# Patient Record
Sex: FEMALE | URBAN - METROPOLITAN AREA
[De-identification: names, ages, dates, MRNs, and addresses within clinical notes are randomized per-mention and may not be internally consistent; named-entity substitution may affect disease eponyms.]

---

## 2024-07-23 ENCOUNTER — TELEPHONE (OUTPATIENT)
Dept: OBGYN CLINIC | Facility: CLINIC | Age: 29
End: 2024-07-23

## 2024-07-23 NOTE — TELEPHONE ENCOUNTER
Pt calling to report +HPT and to establish PN care. Pt notified that we have both male and female providers that she will need to rotate with throughout her PN care. Pt informed that the doctor that is on-call the day that she delivers is the one that will deliver her. Pt agreed and verbalized understanding.    Pt states her approx LMP is 5/4/24 (has reg 28-32d cycles, lasting 7-8d). Patient states she had 2 ultrasound done at [facility] [place] and was told her estimated delivery date is 2/21/25. Patient advised to reach out to [facility] and request ultrasound report to be faxed to our office prior to OB Nurse Education appointment. Patient agreed. Fax umber provided.     Ht- 5'0\"  Wt- 115 lb    Assisted pt in scheduling OBN PC appt on 8/7 at 2pm. Pt advised to start taking PNV containing DHA, folic acid, and iron in it. Pt verbalized understanding.

## 2024-08-07 ENCOUNTER — TELEPHONE (OUTPATIENT)
Dept: OBGYN | Age: 29
End: 2024-08-07

## 2024-08-14 ENCOUNTER — TELEPHONE (OUTPATIENT)
Dept: OBGYN CLINIC | Facility: CLINIC | Age: 29
End: 2024-08-14

## 2024-08-14 NOTE — TELEPHONE ENCOUNTER
Received limited ultrasound report from [facility] dated 7/15/24. Placed in front office fax bin. Patient accepts appointment for OB Nurse Education phone call tomorrow, 8/15/24 and New OB on 8/27/24.

## 2024-08-15 ENCOUNTER — NURSE ONLY (OUTPATIENT)
Dept: OBGYN CLINIC | Facility: CLINIC | Age: 29
End: 2024-08-15

## 2024-08-15 VITALS — HEIGHT: 60 IN

## 2024-08-15 DIAGNOSIS — Z34.01 ENCOUNTER FOR SUPERVISION OF NORMAL FIRST PREGNANCY IN FIRST TRIMESTER (HCC): Primary | ICD-10-CM

## 2024-08-15 RX ORDER — CHOLECALCIFEROL (VITAMIN D3) 25 MCG
1 TABLET,CHEWABLE ORAL DAILY
COMMUNITY

## 2024-08-15 NOTE — PROGRESS NOTES
Pt called for OBN appt today with no complaints.   Pt informed again of both male and female providers and the need to rotate PN appt with all providers since OB on-call will be the one that delivers her.  Varicella and Normal PN labs ordered. Pt advised all labs must be completed and resulted prior to NPN appt. If labs are not completed and resulted the NPN appt will be cancelled.    NPN appt with MD already scheduled.     Office has a copy of 1st trimester ultrasound results from [facility]'s [place]. Placed in Dr. Hickey slot in [place] office for up coming appointment.       Height: 5 ft 0 in  Weight: 115 lb  BMI: 22.46    Partner's name is Rodriguez Prieto contact #[phone]; race: White  Occupation: [employer]     MEDICAL HISTORY    Anemia Yes No meds   Anesthetic complications No    Anxiety/Depression  Yes Anxiety and Depression-no meds or therapy    We discussed [facility] referral, patient appreciative will reach out if wishes   Autoimmune Disorder No    Asthma  Yes Inhalers-weather induced.    Cancer No    Diabetes  No    Gyne/breast Surgery No    Heart Disease No    Hepatitis/Liver Disease  No    History of blood transfusion No    History of abnormal pap No    Hypertension  No    Infertility  No    Kidney Disease/Frequent UTIs  No    Medication Allergies No    Latex Allergies No    Food Allergies  No    Neurological Disorder/Epilepsy Yes Migraines in high school; taking Ibuprofen. Random now, usually occur once she has been in bright light for long periods   Operations/Hospitalizations Yes Frequent hospitalizations over several years due to GI issues   TB exposure  No    Thyroid Dysfunction No    Trauma/Violence  No    Uterine Anomaly  No    Uterine Fibroids  No    Variocosities/DVTs No    Smoker Yes Stopped smoking and vapping of Nicotine 2 wks ago; smoked for 5 years   Drug usage in prior year No    Alcohol Yes Social drinking, has stopped since pregnant    Would you accept a blood transfusion? If no, are you a  Denominational? Yes    No     Will accept blood and blood products        INFECTION HISTORY    Chlamydia Yes 2020   Pt or partner have hx of Genital Herpes Yes Patient and SO have HSV, patient states last outbreak for her was 1 week ago.    Gonorrhea No    Hepatitis B No    HIV No    HPV No    MRSA No    Syphilis No    Tattoos Yes    Live with someone or Exposed to TB No    Rash or viral illness since LMP  No    Varicella No Vaccinated    Pets Yes Cat x2, patient made aware of toxoplasmosis precautions.         GENETICS SCREENING    Genetic Screening    Genetic Screening/Teratology Counseling- Includes patient, baby's father, or anyone in either family with:  Patient's age 35 years or older as of estimated date of delivery: No     Thalassemia (Italian, Greek, Mediterranean, or  background): MCV less than 80: No     Neural tube defect (Meningomyelocele, Spina bifida, or Anencephaly): No     Congenital heart defect: No     Down syndrome: No     Ross-Sachs (Ashkenazi Sikh, Cajun, Azeri [place]idal[place]): No     Canavan disease (Ashkenazi Sikh): No     Familial dysautonomia (Ashkenazi Sikh): No     Sickle cell disease or trait (): No     Hemophilia or other blood disorders: No     Muscular dystrophy: No    Cystic fibrosis: No     Sandoval's chorea: No     Intellectual disability and/or autism: Yes (Comment: FOB cousin has Autism)     If yes, was the person tested for Fragile X?: No     Other inherited genetic or chromosomal disorder: No     Maternal metabolic disorder (eg. Type 1 diabetes, PKU): No     Patient or baby's father had child with birth defects not listed above: No     Recurrent pregnancy loss, or a stillbirth: No     Medications (including supplements, vitamins, herbs, or OTC drugs)/illicit/recreational drugs/alcohol since last menstrual period: No     If yes, agent(s) and strength/dosage: Prenatal vitamins                 MISC    Infant vaccinations  Yes    Pt. Has answered NO 5P questions  and has NO  risk factors.    Pt. Given What pregnant women need to know handout.

## 2024-08-20 ENCOUNTER — HOSPITAL ENCOUNTER (EMERGENCY)
Facility: HOSPITAL | Age: 29
Discharge: HOME | End: 2024-08-20
Attending: EMERGENCY MEDICINE
Payer: COMMERCIAL

## 2024-08-20 VITALS
BODY MASS INDEX: 22 KG/M2 | WEIGHT: 115 LBS | HEART RATE: 90 BPM | OXYGEN SATURATION: 100 % | RESPIRATION RATE: 18 BRPM | SYSTOLIC BLOOD PRESSURE: 117 MMHG | DIASTOLIC BLOOD PRESSURE: 78 MMHG | TEMPERATURE: 97 F

## 2024-08-20 DIAGNOSIS — R11.10 HYPEREMESIS: Primary | ICD-10-CM

## 2024-08-20 LAB
ALBUMIN SERPL-MCNC: 4.5 G/DL (ref 3.2–4.8)
ALP LIVER SERPL-CCNC: 40 U/L
ALT SERPL-CCNC: 19 U/L
ANION GAP SERPL CALC-SCNC: 10 MMOL/L (ref 0–18)
AST SERPL-CCNC: 20 U/L (ref ?–34)
B-HCG SERPL-ACNC: ABNORMAL MIU/ML
B-HCG UR QL: POSITIVE
BASOPHILS # BLD AUTO: 0.03 X10(3) UL (ref 0–0.2)
BASOPHILS NFR BLD AUTO: 0.2 %
BILIRUB DIRECT SERPL-MCNC: 0.3 MG/DL (ref ?–0.3)
BILIRUB SERPL-MCNC: 1 MG/DL (ref 0.3–1.2)
BILIRUB UR QL: NEGATIVE
BUN BLD-MCNC: 5 MG/DL (ref 9–23)
BUN/CREAT SERPL: 9.1 (ref 10–20)
CALCIUM BLD-MCNC: 9.3 MG/DL (ref 8.7–10.4)
CHLORIDE SERPL-SCNC: 106 MMOL/L (ref 98–112)
CLARITY UR: CLEAR
CO2 SERPL-SCNC: 22 MMOL/L (ref 21–32)
COLOR UR: YELLOW
CREAT BLD-MCNC: 0.55 MG/DL
DEPRECATED RDW RBC AUTO: 40.5 FL (ref 35.1–46.3)
EGFRCR SERPLBLD CKD-EPI 2021: 129 ML/MIN/1.73M2 (ref 60–?)
EOSINOPHIL # BLD AUTO: 0 X10(3) UL (ref 0–0.7)
EOSINOPHIL NFR BLD AUTO: 0 %
ERYTHROCYTE [DISTWIDTH] IN BLOOD BY AUTOMATED COUNT: 11.8 % (ref 11–15)
GLUCOSE BLD-MCNC: 129 MG/DL (ref 70–99)
GLUCOSE UR-MCNC: 300 MG/DL
HCT VFR BLD AUTO: 36.7 %
HGB BLD-MCNC: 12.6 G/DL
HGB UR QL STRIP.AUTO: NEGATIVE
IMM GRANULOCYTES # BLD AUTO: 0.06 X10(3) UL (ref 0–1)
IMM GRANULOCYTES NFR BLD: 0.4 %
KETONES UR-MCNC: >150 MG/DL
LEUKOCYTE ESTERASE UR QL STRIP.AUTO: NEGATIVE
LYMPHOCYTES # BLD AUTO: 0.62 X10(3) UL (ref 1–4)
LYMPHOCYTES NFR BLD AUTO: 3.8 %
MAGNESIUM SERPL-MCNC: 1.6 MG/DL (ref 1.6–2.6)
MCH RBC QN AUTO: 32 PG (ref 26–34)
MCHC RBC AUTO-ENTMCNC: 34.3 G/DL (ref 31–37)
MCV RBC AUTO: 93.1 FL
MONOCYTES # BLD AUTO: 0.14 X10(3) UL (ref 0.1–1)
MONOCYTES NFR BLD AUTO: 0.9 %
NEUTROPHILS # BLD AUTO: 15.47 X10 (3) UL (ref 1.5–7.7)
NEUTROPHILS # BLD AUTO: 15.47 X10(3) UL (ref 1.5–7.7)
NEUTROPHILS NFR BLD AUTO: 94.7 %
NITRITE UR QL STRIP.AUTO: NEGATIVE
OSMOLALITY SERPL CALC.SUM OF ELEC: 285 MOSM/KG (ref 275–295)
PH UR: 6 [PH] (ref 5–8)
PLATELET # BLD AUTO: 242 10(3)UL (ref 150–450)
POTASSIUM SERPL-SCNC: 3.4 MMOL/L (ref 3.5–5.1)
PROT SERPL-MCNC: 7.6 G/DL (ref 5.7–8.2)
PROT UR-MCNC: 30 MG/DL
RBC # BLD AUTO: 3.94 X10(6)UL
SODIUM SERPL-SCNC: 138 MMOL/L (ref 136–145)
SP GR UR STRIP: 1.03 (ref 1–1.03)
UROBILINOGEN UR STRIP-ACNC: NORMAL
WBC # BLD AUTO: 16.3 X10(3) UL (ref 4–11)

## 2024-08-20 PROCEDURE — 81001 URINALYSIS AUTO W/SCOPE: CPT | Performed by: EMERGENCY MEDICINE

## 2024-08-20 PROCEDURE — 96374 THER/PROPH/DIAG INJ IV PUSH: CPT

## 2024-08-20 PROCEDURE — 99284 EMERGENCY DEPT VISIT MOD MDM: CPT

## 2024-08-20 PROCEDURE — 84702 CHORIONIC GONADOTROPIN TEST: CPT | Performed by: EMERGENCY MEDICINE

## 2024-08-20 PROCEDURE — 80048 BASIC METABOLIC PNL TOTAL CA: CPT | Performed by: EMERGENCY MEDICINE

## 2024-08-20 PROCEDURE — 80076 HEPATIC FUNCTION PANEL: CPT | Performed by: EMERGENCY MEDICINE

## 2024-08-20 PROCEDURE — S0028 INJECTION, FAMOTIDINE, 20 MG: HCPCS | Performed by: EMERGENCY MEDICINE

## 2024-08-20 PROCEDURE — 96361 HYDRATE IV INFUSION ADD-ON: CPT

## 2024-08-20 PROCEDURE — 96375 TX/PRO/DX INJ NEW DRUG ADDON: CPT

## 2024-08-20 PROCEDURE — 85025 COMPLETE CBC W/AUTO DIFF WBC: CPT | Performed by: EMERGENCY MEDICINE

## 2024-08-20 PROCEDURE — 81025 URINE PREGNANCY TEST: CPT

## 2024-08-20 PROCEDURE — 83735 ASSAY OF MAGNESIUM: CPT | Performed by: EMERGENCY MEDICINE

## 2024-08-20 RX ORDER — ONDANSETRON 2 MG/ML
4 INJECTION INTRAMUSCULAR; INTRAVENOUS ONCE
Status: COMPLETED | OUTPATIENT
Start: 2024-08-20 | End: 2024-08-20

## 2024-08-20 RX ORDER — DIPHENHYDRAMINE HYDROCHLORIDE 50 MG/ML
25 INJECTION INTRAMUSCULAR; INTRAVENOUS ONCE
Status: COMPLETED | OUTPATIENT
Start: 2024-08-20 | End: 2024-08-20

## 2024-08-20 RX ORDER — ONDANSETRON 4 MG/1
4 TABLET, ORALLY DISINTEGRATING ORAL EVERY 4 HOURS PRN
Qty: 10 TABLET | Refills: 0 | Status: SHIPPED | OUTPATIENT
Start: 2024-08-20 | End: 2024-08-27

## 2024-08-20 RX ORDER — FAMOTIDINE 10 MG/ML
20 INJECTION, SOLUTION INTRAVENOUS ONCE
Status: COMPLETED | OUTPATIENT
Start: 2024-08-20 | End: 2024-08-20

## 2024-08-20 RX ORDER — METOCLOPRAMIDE HYDROCHLORIDE 5 MG/ML
10 INJECTION INTRAMUSCULAR; INTRAVENOUS ONCE
Status: COMPLETED | OUTPATIENT
Start: 2024-08-20 | End: 2024-08-20

## 2024-08-20 NOTE — DISCHARGE INSTRUCTIONS
Avoid Marijuana.  Zofran as needed.  Follow-up with OB as soon as possible.  Return to the ER for changes worsening and read all instructions for further recommendations.

## 2024-08-20 NOTE — ED PROVIDER NOTES
Patient Seen in: [facility] Emergency Department      History     Chief Complaint   Patient presents with    Nausea/Vomiting/Diarrhea     Stated Complaint: vomiting/14 wks preg    Subjective:   29 f lmp May 4 , , 14 weeks pregnant-presents with fairly persistent vomiting all day.  It is clear and yellow at times.  She has been vomiting for the last month almost daily.  The patient does continue to smoke marijuana daily and has had issues with that causing vomiting in the past before.  No urinary symptoms.  No specific pain or fever.  No cough or congestion.  No focal weakness or numbness.  No rash.  She said she has had an ultrasound and measurements and this is where she got her dates from.  She has an appointment with Dr. Angel in 1 week.            Objective:   Past Medical History:    Anemia    Anxiety    Asthma (HCC)    Decorative tattoo    Depression    Genital herpes simplex              History reviewed. No pertinent surgical history.             Social History     Socioeconomic History    Marital status: Single   Tobacco Use    Smoking status: Former     Current packs/day: 0.00     Types: Cigarettes     Quit date:      Years since quittin.6     Passive exposure: Never    Smokeless tobacco: Former     Quit date:    Substance and Sexual Activity    Alcohol use: Not Currently     Comment: Social prior to pregnancy    Drug use: Yes     Types: Cannabis              Review of Systems    Positive for stated Chief Complaint: Nausea/Vomiting/Diarrhea    Other systems are as noted in HPI.  Constitutional and vital signs reviewed.      All other systems reviewed and negative except as noted above.    Physical Exam     ED Triage Vitals   BP 24 1459 133/73   Pulse 24 1459 86   Resp 24 1459 18   Temp 24 1459 97 °F (36.1 °C)   Temp src --    SpO2 24 1459 96 %   O2 Device 24 1806 None (Room air)       Current Vitals:   Vital Signs  BP: 117/78  Pulse: 90  Resp:  18  Temp: 97 °F (36.1 °C)  Fetal Heart Tones: 154    Oxygen Therapy  SpO2: 100 %  O2 Device: None (Room air)            Physical Exam  Constitutional:       Comments: wretching   HENT:      Head: Normocephalic.      Right Ear: External ear normal.      Left Ear: External ear normal.      Nose: Nose normal.      Mouth/Throat:      Mouth: Mucous membranes are dry.   Eyes:      Extraocular Movements: Extraocular movements intact.      Pupils: Pupils are equal, round, and reactive to light.   Cardiovascular:      Rate and Rhythm: Normal rate and regular rhythm.      Pulses: Normal pulses.   Pulmonary:      Effort: Pulmonary effort is normal.   Abdominal:      Palpations: Abdomen is soft. There is no mass.      Tenderness: There is no abdominal tenderness.   Musculoskeletal:         General: Normal range of motion.      Cervical back: Normal range of motion.   Skin:     General: Skin is warm.      Capillary Refill: Capillary refill takes less than 2 seconds.   Neurological:      Mental Status: She is alert.      Sensory: No sensory deficit.      Motor: No weakness.               ED Course     Labs Reviewed   CBC WITH DIFFERENTIAL WITH PLATELET - Abnormal; Notable for the following components:       Result Value    WBC 16.3 (*)     Neutrophil Absolute Prelim 15.47 (*)     Neutrophil Absolute 15.47 (*)     Lymphocyte Absolute 0.62 (*)     All other components within normal limits   BASIC METABOLIC PANEL (8) - Abnormal; Notable for the following components:    Glucose 129 (*)     Potassium 3.4 (*)     BUN 5 (*)     BUN/CREA Ratio 9.1 (*)     All other components within normal limits   URINALYSIS WITH CULTURE REFLEX - Abnormal; Notable for the following components:    Glucose Urine 300 (*)     Ketones Urine >150 (*)     Protein Urine 30 (*)     Squamous Epi. Cells Few (*)     All other components within normal limits   HCG, BETA SUBUNIT (QUANT PREGNANCY TEST) - Abnormal; Notable for the following components:    Hcg  Quantitative 51,668.6 (*)     All other components within normal limits   POCT PREGNANCY URINE - Abnormal; Notable for the following components:    POCT Urine Pregnancy Positive (*)     All other components within normal limits   HEPATIC FUNCTION PANEL (7) - Normal   MAGNESIUM - Normal          ED Course as of 08/20/24 1852  ------------------------------------------------------------  Time: 08/20 1627  Comment: FHTS 154  ------------------------------------------------------------  Time: 08/20 1627  Comment: Labs independently interpreted by me.  There is a leukocytosis but no suspicion of infection as UA is on remarkable other than signs of dehydration hepatic profile normal, BMP showed a mild hypokalemia quant 51,000 which would be appropriate for her dates  ------------------------------------------------------------  Time: 08/20 1723  Comment: Patient was still having some vomiting even after getting Zofran so Benadryl Reglan was given.  Additional fluids will be given.  Will reassess.  ------------------------------------------------------------  Time: 08/20 1732  Comment: Patient now sleeping resting more comfortably  ------------------------------------------------------------  Time: 08/20 1814  Comment: Patient doing better.  Asking us to take her IV out.  I had her do a small oral challenge and she is tolerating some juice.  She did have bowel movements today.  Does not seem to be consistent with a bowel obstruction as she has no other risks.              MDM                                         Medical Decision Making  Patient pregnancy has been vomiting for a month now worse today but also smokes marijuana.  Differential could include hyperemesis from pregnancy versus marijuana versus bowel obstruction versus infectious process versus electrolyte disturbances.  Will continue to check labs give fluids and give Zofran and Pepcid.    Amount and/or Complexity of Data Reviewed  External Data Reviewed:  notes.     Details: OB office telephone notes reviewed.  She does have an appointment  Labs: ordered. Decision-making details documented in ED Course.  Discussion of management or test interpretation with external provider(s): Patient did not want to be observed any longer.  Understands she is welcome to come back if she changes her mind or gets worsening symptoms.    Risk  Prescription drug management.        Disposition and Plan     Clinical Impression:  1. Hyperemesis    Marijuana versus pregnancy as the cause    Disposition:  Discharge  8/20/2024  6:16 pm    Follow-up:  Marci Angel MD  [address] [place] [id number]  [phone]    Follow up            Medications Prescribed:  Discharge Medication List as of 8/20/2024  6:30 PM        START taking these medications    Details   ondansetron 4 MG Oral Tablet Dispersible Take 1 tablet (4 mg total) by mouth every 4 (four) hours as needed for Nausea., Normal, Disp-10 tablet, R-0

## 2024-08-20 NOTE — ED INITIAL ASSESSMENT (HPI)
Patient presents to the ED c/o nausea and vomiting since 0300. Pt is 14 weeks pregnant. Pt states she smoked cannabis around 0300.

## 2024-08-21 ENCOUNTER — APPOINTMENT (OUTPATIENT)
Dept: OBGYN | Age: 29
End: 2024-08-21

## 2024-08-23 ENCOUNTER — HOSPITAL ENCOUNTER (EMERGENCY)
Facility: HOSPITAL | Age: 29
Discharge: HOME | End: 2024-08-24
Attending: EMERGENCY MEDICINE
Payer: COMMERCIAL

## 2024-08-23 DIAGNOSIS — E87.6 HYPOKALEMIA: ICD-10-CM

## 2024-08-23 DIAGNOSIS — O21.0 HYPEREMESIS GRAVIDARUM (HCC): Primary | ICD-10-CM

## 2024-08-23 LAB
ALBUMIN SERPL-MCNC: 5 G/DL (ref 3.2–4.8)
ALBUMIN/GLOB SERPL: 1.4 {RATIO} (ref 1–2)
ALP LIVER SERPL-CCNC: 47 U/L
ALT SERPL-CCNC: 30 U/L
ANION GAP SERPL CALC-SCNC: 12 MMOL/L (ref 0–18)
AST SERPL-CCNC: 28 U/L (ref ?–34)
BASOPHILS # BLD AUTO: 0.03 X10(3) UL (ref 0–0.2)
BASOPHILS NFR BLD AUTO: 0.2 %
BILIRUB SERPL-MCNC: 2.1 MG/DL (ref 0.3–1.2)
BUN BLD-MCNC: 9 MG/DL (ref 9–23)
BUN/CREAT SERPL: 15.5 (ref 10–20)
CALCIUM BLD-MCNC: 10.1 MG/DL (ref 8.7–10.4)
CHLORIDE SERPL-SCNC: 100 MMOL/L (ref 98–112)
CO2 SERPL-SCNC: 23 MMOL/L (ref 21–32)
CREAT BLD-MCNC: 0.58 MG/DL
DEPRECATED RDW RBC AUTO: 39.1 FL (ref 35.1–46.3)
EGFRCR SERPLBLD CKD-EPI 2021: 127 ML/MIN/1.73M2 (ref 60–?)
EOSINOPHIL # BLD AUTO: 0.01 X10(3) UL (ref 0–0.7)
EOSINOPHIL NFR BLD AUTO: 0.1 %
ERYTHROCYTE [DISTWIDTH] IN BLOOD BY AUTOMATED COUNT: 11.8 % (ref 11–15)
GLOBULIN PLAS-MCNC: 3.6 G/DL (ref 2–3.5)
GLUCOSE BLD-MCNC: 76 MG/DL (ref 70–99)
HCT VFR BLD AUTO: 41.1 %
HGB BLD-MCNC: 14.4 G/DL
IMM GRANULOCYTES # BLD AUTO: 0.06 X10(3) UL (ref 0–1)
IMM GRANULOCYTES NFR BLD: 0.4 %
LYMPHOCYTES # BLD AUTO: 1.73 X10(3) UL (ref 1–4)
LYMPHOCYTES NFR BLD AUTO: 10.4 %
MCH RBC QN AUTO: 31.7 PG (ref 26–34)
MCHC RBC AUTO-ENTMCNC: 35 G/DL (ref 31–37)
MCV RBC AUTO: 90.5 FL
MONOCYTES # BLD AUTO: 0.72 X10(3) UL (ref 0.1–1)
MONOCYTES NFR BLD AUTO: 4.3 %
NEUTROPHILS # BLD AUTO: 14.02 X10 (3) UL (ref 1.5–7.7)
NEUTROPHILS # BLD AUTO: 14.02 X10(3) UL (ref 1.5–7.7)
NEUTROPHILS NFR BLD AUTO: 84.6 %
OSMOLALITY SERPL CALC.SUM OF ELEC: 277 MOSM/KG (ref 275–295)
PLATELET # BLD AUTO: 284 10(3)UL (ref 150–450)
POTASSIUM SERPL-SCNC: 3.3 MMOL/L (ref 3.5–5.1)
PROT SERPL-MCNC: 8.6 G/DL (ref 5.7–8.2)
RBC # BLD AUTO: 4.54 X10(6)UL
SODIUM SERPL-SCNC: 135 MMOL/L (ref 136–145)
WBC # BLD AUTO: 16.6 X10(3) UL (ref 4–11)

## 2024-08-23 PROCEDURE — 99284 EMERGENCY DEPT VISIT MOD MDM: CPT

## 2024-08-23 PROCEDURE — 85025 COMPLETE CBC W/AUTO DIFF WBC: CPT

## 2024-08-23 PROCEDURE — 85025 COMPLETE CBC W/AUTO DIFF WBC: CPT | Performed by: EMERGENCY MEDICINE

## 2024-08-23 PROCEDURE — 80053 COMPREHEN METABOLIC PANEL: CPT

## 2024-08-23 PROCEDURE — 96374 THER/PROPH/DIAG INJ IV PUSH: CPT

## 2024-08-23 PROCEDURE — 96361 HYDRATE IV INFUSION ADD-ON: CPT

## 2024-08-23 PROCEDURE — 80053 COMPREHEN METABOLIC PANEL: CPT | Performed by: EMERGENCY MEDICINE

## 2024-08-23 RX ORDER — ONDANSETRON 2 MG/ML
4 INJECTION INTRAMUSCULAR; INTRAVENOUS ONCE
Status: COMPLETED | OUTPATIENT
Start: 2024-08-23 | End: 2024-08-23

## 2024-08-23 RX ORDER — DOXYLAMINE SUCCINATE AND PYRIDOXINE HYDROCHLORIDE, DELAYED RELEASE TABLETS 10 MG/10 MG 10; 10 MG/1; MG/1
2 TABLET, DELAYED RELEASE ORAL ONCE
Status: COMPLETED | OUTPATIENT
Start: 2024-08-23 | End: 2024-08-24

## 2024-08-23 RX ORDER — MAGNESIUM HYDROXIDE/ALUMINUM HYDROXICE/SIMETHICONE 120; 1200; 1200 MG/30ML; MG/30ML; MG/30ML
30 SUSPENSION ORAL ONCE
Status: COMPLETED | OUTPATIENT
Start: 2024-08-23 | End: 2024-08-23

## 2024-08-24 VITALS
SYSTOLIC BLOOD PRESSURE: 119 MMHG | OXYGEN SATURATION: 100 % | HEIGHT: 60 IN | DIASTOLIC BLOOD PRESSURE: 72 MMHG | RESPIRATION RATE: 20 BRPM | HEART RATE: 60 BPM | TEMPERATURE: 98 F | WEIGHT: 115 LBS | BODY MASS INDEX: 22.58 KG/M2

## 2024-08-24 RX ORDER — DOXYLAMINE SUCCINATE AND PYRIDOXINE HYDROCHLORIDE, DELAYED RELEASE TABLETS 10 MG/10 MG 10; 10 MG/1; MG/1
2 TABLET, DELAYED RELEASE ORAL NIGHTLY
Qty: 40 TABLET | Refills: 0 | Status: SHIPPED | OUTPATIENT
Start: 2024-08-24 | End: 2024-09-13

## 2024-08-24 RX ORDER — POTASSIUM CHLORIDE 1500 MG/1
20 TABLET, EXTENDED RELEASE ORAL 2 TIMES DAILY
Qty: 10 TABLET | Refills: 0 | Status: SHIPPED | OUTPATIENT
Start: 2024-08-24 | End: 2024-08-29

## 2024-08-24 NOTE — ED INITIAL ASSESSMENT (HPI)
Pt presents to with c/o vomiting. Pt states she has not been able to keep anything down for 3 days and reports acid reflux. Pt states she has had vomiting for a few weeks that worsened the past three days. Pt states she was seen here Wednesday for the same issue.    14 weeks pregnant.

## 2024-08-24 NOTE — ED PROVIDER NOTES
Patient Seen in: [facility] Emergency Department    History     Chief Complaint   Patient presents with    Pregnancy Issues       HPI    29-year-old female, pregnant at 14 weeks gestation who presents to the emergency department given nonbloody nonbilious emesis, nausea and epigastric burning sensation that she has had even before her pregnancy started several months ago, at times thought to be related to marijuana by prior provider she states, still smoking marijuana.  Denies any urinary symptoms or vaginal bleeding.  No new pelvic cramping    History reviewed.   Past Medical History:    Anemia    Anxiety    Asthma (HCC)    Decorative tattoo    Depression    Genital herpes simplex       History reviewed. History reviewed. No pertinent surgical history.      Medications :  (Not in a hospital admission)       No family history on file.    Smoking Status:   Social History     Socioeconomic History    Marital status: Single   Tobacco Use    Smoking status: Former     Current packs/day: 0.00     Types: Cigarettes     Quit date:      Years since quittin.6     Passive exposure: Never    Smokeless tobacco: Former     Quit date:    Vaping Use    Vaping status: Never Used   Substance and Sexual Activity    Alcohol use: Not Currently     Comment: Social prior to pregnancy    Drug use: Yes     Types: Cannabis       Constitutional and vital signs reviewed.      Social History and Family History elements reviewed from today, pertinent positives to the presenting problem noted.    Physical Exam     ED Triage Vitals [24]   /64   Pulse 96   Resp 20   Temp 97.8 °F (36.6 °C)   Temp src Temporal   SpO2 97 %   O2 Device None (Room air)       All measures to prevent infection transmission during my interaction with the patient were taken. The patient was already wearing a droplet mask on my arrival to the room. Personal protective equipment was worn throughout the duration of the exam.  Handwashing  was performed prior to and after the exam.  Stethoscope and any equipment used during my examination was cleaned with super sani-cloth germicidal wipes following the exam.     Physical Exam    General: NAD  Head: Normocephalic and atraumatic.  Mouth/Throat/Ears/Nose: Oropharynx is clear and moist.   Eyes: Conjunctivae and EOM are normal.   Neck: Normal range of motion. Supple.   Cardiovascular: Normal rate, regular rhythm, normal heart sounds.  Respiratory/Chest: Clear and equal bilaterally. Exhibits no tenderness.  Gastrointestinal: Soft, very slight ttp at epigastrum, non-distended. Bowel sounds are normal.   Musculoskeletal:No swelling or deformity.   Neurological: Alert and appropriate. No focal deficits.   Skin: Skin is warm and dry. No pallor.  Psychiatric: Has a normal mood and affect.      ED Course        Labs Reviewed   COMP METABOLIC PANEL (14) - Abnormal; Notable for the following components:       Result Value    Sodium 135 (*)     Potassium 3.3 (*)     Bilirubin, Total 2.1 (*)     Total Protein 8.6 (*)     Albumin 5.0 (*)     Globulin  3.6 (*)     All other components within normal limits   CBC WITH DIFFERENTIAL WITH PLATELET - Abnormal; Notable for the following components:    WBC 16.6 (*)     Neutrophil Absolute Prelim 14.02 (*)     Neutrophil Absolute 14.02 (*)     All other components within normal limits   URINALYSIS WITH CULTURE REFLEX   RAINBOW DRAW BLUE   RAINBOW DRAW GOLD       As Interpreted by me    Imaging Results Available and Reviewed while in ED: No results found.  ED Medications Administered:   Medications   sodium chloride 0.9 % IV bolus 1,000 mL (1,000 mL Intravenous New Bag 8/23/24 2306)   ondansetron (Zofran) 4 MG/2ML injection 4 mg (4 mg Intravenous Given 8/23/24 2305)   doxylamine-pyridoxine (Diclegis) 10-10 MG DR tab 2 tablet (2 tablets Oral Given 8/24/24 0001)   alum-mag hydroxide-simethicone (Maalox) 200-200-20 MG/5ML oral suspension 30 mL (30 mL Oral Given 8/23/24 9552)          [facility]     Vitals:    08/23/24 2114   BP: 105/64   Pulse: 96   Resp: 20   Temp: 97.8 °F (36.6 °C)   TempSrc: Temporal   SpO2: 97%   Weight: 52.2 kg   Height: 152.4 cm (5')     *I personally reviewed and interpreted all ED vitals.    Pulse Ox: 97%, Room air, Normal     Monitor Interpretation:   normal sinus rhythm as interpreted by me.  The cardiac monitor was ordered given concern for electrolyte derangement.      Medical Decision Making      Differential Diagnosis/ Diagnostic Considerations: GERD, hyperemesis gravidarum cannabinoid hyperemesis syndrome    Complicating Factors: The patient already has pregnancy, marijuana use to contribute to the complexity of this ED evaluation.    I reviewed prior chart records including ED visit note from August 20, 2024.   She feels improved after supportive care, tolerating po.  Fetal heart rate 159.  Labs with just slight hypokalemia on my interpretation, potassium chloride prescribed    I advised her to at least trial marijuana cessation.     Discharged in stable condition.  Patient is comfortable with the plan.    Prescriptions:Diclegis      Disposition and Plan     Clinical Impression:  1. Hyperemesis gravidarum (HCC)    2. Hypokalemia        Disposition:  Discharge    Follow-up:  Marci Angel MD  [address] [place] [id number] [phone]    Schedule an appointment as soon as possible for a visit in 1 day(s)        Medications Prescribed:  Current Discharge Medication List        START taking these medications    Details   doxylamine-pyridoxine 10-10 MG Oral Tab EC Take 2 tablets by mouth nightly for 20 days.  Qty: 40 tablet, Refills: 0      potassium chloride 20 MEQ Oral Tab CR Take 1 tablet (20 mEq total) by mouth 2 (two) times daily for 5 days.  Qty: 10 tablet, Refills: 0

## 2024-08-25 ENCOUNTER — LAB ENCOUNTER (OUTPATIENT)
Dept: LAB | Facility: HOSPITAL | Age: 29
End: 2024-08-25
Attending: OBSTETRICS & GYNECOLOGY
Payer: COMMERCIAL

## 2024-08-25 DIAGNOSIS — Z34.01 ENCOUNTER FOR SUPERVISION OF NORMAL FIRST PREGNANCY IN FIRST TRIMESTER (HCC): ICD-10-CM

## 2024-08-25 LAB
ANTIBODY SCREEN: NEGATIVE
HBV SURFACE AG SER-ACNC: <0.1 [IU]/L
HBV SURFACE AG SERPL QL IA: NONREACTIVE
HCV AB SERPL QL IA: NONREACTIVE
RH BLOOD TYPE: POSITIVE
RUBV IGG SER QL: POSITIVE
RUBV IGG SER-ACNC: 27 IU/ML (ref 10–?)

## 2024-08-25 PROCEDURE — 87389 HIV-1 AG W/HIV-1&-2 AB AG IA: CPT | Performed by: OBSTETRICS & GYNECOLOGY

## 2024-08-25 PROCEDURE — 86592 SYPHILIS TEST NON-TREP QUAL: CPT | Performed by: OBSTETRICS & GYNECOLOGY

## 2024-08-25 PROCEDURE — 86900 BLOOD TYPING SEROLOGIC ABO: CPT

## 2024-08-25 PROCEDURE — 36415 COLL VENOUS BLD VENIPUNCTURE: CPT

## 2024-08-25 PROCEDURE — 86762 RUBELLA ANTIBODY: CPT | Performed by: OBSTETRICS & GYNECOLOGY

## 2024-08-25 PROCEDURE — 86787 VARICELLA-ZOSTER ANTIBODY: CPT

## 2024-08-25 PROCEDURE — 87340 HEPATITIS B SURFACE AG IA: CPT | Performed by: OBSTETRICS & GYNECOLOGY

## 2024-08-25 PROCEDURE — 86780 TREPONEMA PALLIDUM: CPT | Performed by: OBSTETRICS & GYNECOLOGY

## 2024-08-25 PROCEDURE — 86803 HEPATITIS C AB TEST: CPT | Performed by: OBSTETRICS & GYNECOLOGY

## 2024-08-25 PROCEDURE — 86850 RBC ANTIBODY SCREEN: CPT

## 2024-08-25 PROCEDURE — 86901 BLOOD TYPING SEROLOGIC RH(D): CPT

## 2024-08-26 LAB
RPR SER QL: NONREACTIVE
VZV IGG SER IA-ACNC: 219.7 (ref 165–?)

## 2024-08-27 ENCOUNTER — INITIAL PRENATAL (OUTPATIENT)
Dept: OBGYN CLINIC | Facility: CLINIC | Age: 29
End: 2024-08-27

## 2024-08-27 ENCOUNTER — TELEPHONE (OUTPATIENT)
Dept: OBGYN CLINIC | Facility: CLINIC | Age: 29
End: 2024-08-27

## 2024-08-27 VITALS
WEIGHT: 112 LBS | HEART RATE: 79 BPM | SYSTOLIC BLOOD PRESSURE: 91 MMHG | BODY MASS INDEX: 22 KG/M2 | DIASTOLIC BLOOD PRESSURE: 54 MMHG

## 2024-08-27 DIAGNOSIS — Z13.79 GENETIC SCREENING: Primary | ICD-10-CM

## 2024-08-27 DIAGNOSIS — F41.9 ANXIETY: ICD-10-CM

## 2024-08-27 DIAGNOSIS — Z34.02 ENCOUNTER FOR SUPERVISION OF NORMAL FIRST PREGNANCY IN SECOND TRIMESTER (HCC): Primary | ICD-10-CM

## 2024-08-27 PROBLEM — K21.9 GERD (GASTROESOPHAGEAL REFLUX DISEASE): Status: ACTIVE | Noted: 2024-08-27

## 2024-08-27 PROBLEM — O99.320 MARIJUANA USE DURING PREGNANCY (HCC): Status: ACTIVE | Noted: 2024-08-27

## 2024-08-27 PROBLEM — F12.90 MARIJUANA USE DURING PREGNANCY (HCC): Status: ACTIVE | Noted: 2024-08-27

## 2024-08-27 PROBLEM — F32.A ANXIETY AND DEPRESSION: Status: ACTIVE | Noted: 2024-08-27

## 2024-08-27 PROBLEM — Z3A.14 14 WEEKS GESTATION OF PREGNANCY (HCC): Status: ACTIVE | Noted: 2024-08-27

## 2024-08-27 PROBLEM — O98.311 GENITAL HERPES AFFECTING PREGNANCY IN FIRST TRIMESTER (HCC): Status: ACTIVE | Noted: 2024-08-27

## 2024-08-27 PROBLEM — A60.09 GENITAL HERPES AFFECTING PREGNANCY IN FIRST TRIMESTER (HCC): Status: ACTIVE | Noted: 2024-08-27

## 2024-08-27 LAB
APPEARANCE: CLEAR
GLUCOSE (URINE DIPSTICK): NEGATIVE MG/DL
KETONES (URINE DIPSTICK): NEGATIVE MG/DL
MULTISTIX LOT#: NORMAL NUMERIC
NITRITE, URINE: NEGATIVE
PROTEIN (URINE DIPSTICK): NEGATIVE MG/DL
URINE-COLOR: YELLOW

## 2024-08-27 PROCEDURE — 81002 URINALYSIS NONAUTO W/O SCOPE: CPT | Performed by: OBSTETRICS & GYNECOLOGY

## 2024-08-27 NOTE — PROGRESS NOTES
New OB.  Dating US reviewed.  Prenatal record updated.  PL created. ER visits x 2 for N/V. Much better this week. No vb.  Wants genetics.  Discussed MJ cessation and maternal/fetal risks.  PE wnl.  Pap with cultures performed.  +FHT by doppler today.  RTC 4 wks.

## 2024-08-28 LAB
C TRACH DNA SPEC QL NAA+PROBE: NEGATIVE
N GONORRHOEA DNA SPEC QL NAA+PROBE: NEGATIVE
T VAGINALIS RRNA SPEC QL NAA+PROBE: NEGATIVE

## 2024-08-29 LAB
TREPONEMAL ANTIBODIES, TPPA: NON REACTIVE
TREPONEMAL ANTIBODIES, TPPA: NON REACTIVE

## 2024-08-30 ENCOUNTER — TELEPHONE (OUTPATIENT)
Age: 29
End: 2024-08-30

## 2024-08-30 LAB — LAST PAP RESULT: NORMAL

## 2024-08-30 NOTE — TELEPHONE ENCOUNTER
Hello - I am reaching out from the [facility] Navigation department, following up on an order from your provider's office to assist in connecting you with resources for care. If you would like to discuss this further, please give us a call at [phone], or for more immediate assistance you can contact our 24-hour help line at [phone]. We look forward to hearing from you soon.

## 2024-09-04 NOTE — PROGRESS NOTES
Outpatient Maternal-Fetal Medicine Consultation    Dear Dr. Hickey,    Thank you for requesting ultrasound evaluation and maternal fetal medicine consultation on your patient Valeria Hayes.  As you are aware she is a 29 year old female with a Sprague pregnancy at 16w0d.  A maternal-fetal medicine consultation was requested secondary to desire for aneuploidy screening and h/o marijuana use.  Her prenatal records and labs were reviewed.    HISTORY  OB History    Para Term  AB Living   1 0 0 0 0 0   SAB IAB Ectopic Multiple Live Births   0 0 0 0 0     # 1 - Date: None, Sex: None, Weight: None, GA: None, Type: None, Apgar1: None, Apgar5: None, Living: None, Birth Comments: None    Past Medical History  The patient  has a past medical history of Anemia, Anxiety, Asthma (HCC), Decorative tattoo, Depression, and Genital herpes simplex.    Past Surgical History  The patient  has no past surgical history on file.    Family History  The patient has no family status information on file.       Medications:   Current Outpatient Medications:     doxylamine-pyridoxine 10-10 MG Oral Tab EC, Take 2 tablets by mouth nightly for 20 days., Disp: 40 tablet, Rfl: 0    prenatal vitamin with DHA 27-0.8-228 MG Oral Cap, Take 1 capsule by mouth daily., Disp: , Rfl:   Allergies: No Known Allergies      PHYSICAL EXAMINATION:  BP 90/51   Pulse 70   Wt 112 lb (50.8 kg)   LMP 2024 (Approximate)   BMI 21.87 kg/m²   General: alert and oriented,no acute distress  Abdomen: gravid, soft, non-tender  Extremities: non-tender, no edema      OBSTETRIC ULTRASOUND  The patient had a limited ultrasound today which I interpreted the results and reviewed them with the patient.    Ultrasound Findings:  Single IUP in breech presentation.    Placenta is posterior.   A 3 vessel cord is noted.  Cardiac activity is present at 156 bpm   g ( 0 lb 5 oz);  MVP is 5 cm .      See imaging tab for the complete US  report.    DISCUSSION  During her visit we discussed and reviewed the following issues:  CELL FREE FETAL DNA TESTING INFORMATION  Sequencing cfDNA is a screening test; it has false-positive and negative results and does not test for all genetic syndromes or all aneuploidies: it tests for trisomy 21, 18, and 13 and sometimes sex chromosome aneuploidy. Diagnostic procedures, such as amniocentesis, obtain fetal cells, and subsequent testing by karyotyping or microarray can diagnose all aneuploidies; can distinguish between full trisomy and trisomy caused by an unbalanced chromosomal rearrangement; and can detect mosaicism and microdeletions/microduplications (microarray) and, via amniotic fluid AFP and acetylcholinesterase, open neural tube defects. If there are one or more structural anomalies on ultrasound examination, a microarray on amniocytes is the preferred test     Both the mother and the fetal-placental unit produce cfDNA. The primary source of so-called \"fetal\" cfDNA in the maternal circulation is thought to be apoptosis of placental cells (syncytiotrophoblast), while maternal hematopoietic cells are the source of most maternal cfDNA. A lesser source is apoptosis of fetal erythroblasts generating cfDNA in the fetal circulation, and these fragments can cross the placenta and enter the maternal circulation. Since the fetus and the placenta originate from a single fertilized egg, they are usually genetically identical, but differences between the placenta and fetus are important sources of discordant cfDNA test results (eg, confined placental mosaicism).  Trisomy 21, 18, and 13 -- cfDNA is the most sensitive screening option for these aneuploidies. Performance varies by trisomy and by methodology but is rather similar in both high- and low-risk women. Based on multiple meta-analyses, the consensus DRs and FPRs were as follows:  ?Trisomy 21 - DR 99.5 percent, FPR 0.05 percent   ?Trisomy 18 - DR 97.7 percent, FPR  0.04 percent   ?Trisomy 13 - DR 96.1 percent, FPR 0.06 percent     Low cell fraction  Cell-free DNA test failures may occur because of the complex laboratory processing procedures, early gestational age (less than 9-10 weeks), the types of laboratory methods, and the presence of a genetic condition, particularly trisomy 13 or 18 and are also seen more frequently in patients with high BMI, increasing maternal age, certain racial backgrounds (seen more frequently in Black women and South  women in comparison to white women), and IVF pregnancies, 45 as well as maternal drug exposure (low-molecular-weight heparin)  Some aneuploidies also have a low cell fraction.     1 to 5 percent of cfDNA tests do not yield a result, and obese women are at increased risk of receiving a test failure.  The patient has three options in this setting:  Repeat the cfDNA test, if allowed by the laboratory (some failures, like large regions of homozygosity or dizygotic twins, will always cause the test to fail and repeat testing is not an option). Repeat testing, when allowed, is successful in 50 to 80 percent of cases .  Standard serum marker/ultrasound screening.  Invasive procedure (amniocentesis, CVS) and diagnostic testing (karyotyping/microarray).     False-positive cell-free DNA test results occur because of confined placental mosaicism, which can be associated with an increased risk of fetal growth restriction. High or low fetal fraction has been associated with adverse pregnancy outcomes in some studies.      Marijuana -- she has been using marijuana since age 13.  She was using multiple times a day and is down to once or twice daily.  She is going to continue to try to reduce her use during pregnancy.  Marijuana (cannabis) is the most common illicit substance used during pregnancy. Prevalence varies according to maternal age, racial or ethnic background, and socioeconomic status. Self-reported rates of use vary from 2 to 5  percent in many studies to approximately 30 percent among young, urban, and socioeconomically disadvantaged women. Of women who use marijuana, approximately 50 percent continue use during pregnancy.    Chemical products from marijuana use are transferred across the placenta and into breast milk. In rat models, fetal plasma levels were approximately 10 percent of maternal levels after acute exposure to delta-9-tetrahydrocannabinol (THC), the primary psychoactive cannabinoid. However, repetitive exposure of THC resulted in higher fetal levels. In comparison with animal models, studies assessing the impact of marijuana use on humans may be confounded by polysubstance use, socioeconomic factors, and the multiple chemicals present in marijuana smoke, which may present in greater concentrations than in regular tobacco smoke. In addition, contemporary marijuana products have higher quantities of THC than during previous decades of study.    While there is no high-quality evidence to suggest an increase in congenital anomalies among marijuana users, the findings from available studies are limited by relatively small numbers of women whose used only cannabis and confounders, such as lower supplemental folic acid intake among users. Mixed results have also been reported regarding pregnancy outcomes such as low birth weight,  birth, and stillbirth in marijuana users. Better cohort studies that corrected for maternal confounders such as socioeconomic status, maternal weight, tobacco use, and alcohol consumption have reported an association between cannabis use and low birth weight,  birth, small for gestational age, and admission to a  intensive care unit. As an example, in an international case-control study that adjusted for maternal age, cigarette smoking, alcohol use, and socioeconomic index, continued maternal marijuana use at 20 weeks of gestation was associated with more than a five-times-increased  risk of spontaneous  birth.     The American College of Obstetricians and Gynecologists (ACOG) discourages marijuana use during pregnancy due to concerning trends suggest a negative impact on pregnancy outcomes in women who use marijuana. Similarly, while data are limited regarding effects of marijuana use during lactation, ACOG and the Academy of Breastfeeding Medicine discourage marijuana use during breastfeeding.      IMPRESSION:  IUP at 16w0d  Normal fluid, fetal activity and placenta  Marijuana use, daily during pregnancy but reducing the number of puffs daily  Desire for aneuploidy screening    RECOMMENDATIONS:  Continue care with Dr. Hickey  Level II ultrasound at ~20-21 weeks  Third trimester fetal growth & BPP at ~ 32 weeks  Marijuana cessation was encouraged    Total time spent 40 minutes this calendar day which includes preparing to see the patient including chart review, obtaining and/or reviewing additional medical history, performing a physical exam and evaluation, documenting clinical information in the electronic medical record, independently interpreting results, counseling the patient, communicating results to the patient/family/caregiver and coordinating care.     Case discussed with patient who demonstrated understanding and agreement with plan.     Thank you for allowing me to participate in the care of this patient.  Please feel free to contact me with any questions.    Kendra Best MD  Maternal-Fetal Medicine       Note to patient and family:  The 21st Century Cures Act makes medical notes available to patients in the interest of transparency.  However, please be advised that this is a medical document.  It is intended as a peer to peer communication.  It is written in medical language and may contain abbreviations or verbiage that are technical and unfamiliar.  It may appear blunt or direct.  Medical documents are intended to carry relevant information, facts as evident, and the  clinical opinion of the practitioner.

## 2024-09-06 ENCOUNTER — HOSPITAL ENCOUNTER (OUTPATIENT)
Dept: PERINATAL CARE | Facility: HOSPITAL | Age: 29
Discharge: HOME | End: 2024-09-06
Attending: OBSTETRICS & GYNECOLOGY
Payer: COMMERCIAL

## 2024-09-06 VITALS
DIASTOLIC BLOOD PRESSURE: 51 MMHG | BODY MASS INDEX: 22 KG/M2 | HEART RATE: 70 BPM | SYSTOLIC BLOOD PRESSURE: 90 MMHG | WEIGHT: 112 LBS

## 2024-09-06 DIAGNOSIS — Z13.79 GENETIC TESTING: Primary | ICD-10-CM

## 2024-09-06 DIAGNOSIS — Z13.79 GENETIC SCREENING: ICD-10-CM

## 2024-09-06 DIAGNOSIS — Z13.79 GENETIC TESTING: ICD-10-CM

## 2024-09-06 DIAGNOSIS — F12.90 MARIJUANA USE DURING PREGNANCY (HCC): ICD-10-CM

## 2024-09-06 DIAGNOSIS — O99.320 MARIJUANA USE DURING PREGNANCY (HCC): ICD-10-CM

## 2024-09-06 PROCEDURE — 36415 COLL VENOUS BLD VENIPUNCTURE: CPT

## 2024-09-06 PROCEDURE — 76815 OB US LIMITED FETUS(S): CPT | Performed by: OBSTETRICS & GYNECOLOGY

## 2024-09-06 NOTE — PROGRESS NOTES
SUBJECTIVE:   Hailey Blanco is a 3 week old female, here for a routine health maintenance visit,   accompanied by her mother.    Patient was roomed by: Camila Salas MA 202010:31 AM    Do you have any forms to be completed?  no    BIRTH HISTORY  No birth history on file.  Hepatitis B # 1 given in nursery: yes   metabolic screening: Results not known at this time--FAX request to [facility] at [phone]   hearing screen: Passed--parent report     SOCIAL HISTORY  Child lives with: mother, maternal grandmother and maternal step father and moms boyfriend  Who takes care of your infant: mother  Language(s) spoken at home: English  Recent family changes/social stressors: recent birth of a baby    SAFETY/HEALTH RISK  Is your child around anyone who smokes?  YES, passive exposure from mom smokes outside and not around baby   TB exposure:           None    Is your car seat less than 6 years old, in the back seat, rear-facing, 5-point restraint:  Yes    DAILY ACTIVITIES  WATER SOURCE: city water    NUTRITION  Breastfeeding and formula: Similac Sensitive (lactose free)    SLEEP  Arrangements:    crib    sleeps on back  Problems    none    ELIMINATION  Stools:    normal breast milk stools    every 2 days  Urination:    normal wet diapers    QUESTIONS/CONCERNS: questions about pooping as she seems like she is in pain and only goes every two days     DEVELOPMENT  Milestones (by observation/ exam/ report) 75-90% ile  PERSONAL/ SOCIAL/COGNITIVE:    Sustains periods of wakefulness for feeding    Makes brief eye contact with adult when held  LANGUAGE:    Cries with discomfort    Calms to adult's voice  GROSS MOTOR:    Lifts head briefly when prone    Kicks / equal movements  FINE MOTOR/ ADAPTIVE:    Keeps hands in a fist    PROBLEM LIST  Patient Active Problem List   Diagnosis     Breast buds in        MEDICATIONS  Current Outpatient Medications   Medication Sig Dispense Refill     cholecalciferol  Pt her for a Limited OB ultrasound  CF DNA collected   "(JUST D) 10 MCG/ML (400 units/ml) LIQD liquid Take 1 mL (400 Units) by mouth daily 90 mL 3        ALLERGY  No Known Allergies    IMMUNIZATIONS  Immunization History   Administered Date(s) Administered     Hepatitis A Vac Ped/Adol-3 Dose 2020       HEALTH HISTORY  No major problems since discharge from nursery    ROS  Constitutional, eye, ENT, skin, respiratory, cardiac, and GI are normal except as otherwise noted.    OBJECTIVE:   EXAM  Pulse 168   Temp 97.9  F (36.6  C) (Tympanic)   Resp 22   Ht 1' 8.75\" (0.527 m)   Wt 8 lb 2 oz (3.685 kg)   HC 14\" (35.6 cm)   SpO2 100%   BMI 13.27 kg/m    31 %ile based on WHO (Girls, 0-2 years) head circumference-for-age based on Head Circumference recorded on 2020.  25 %ile based on WHO (Girls, 0-2 years) weight-for-age data based on Weight recorded on 2020.  43 %ile based on WHO (Girls, 0-2 years) Length-for-age data based on Length recorded on 2020.  21 %ile based on WHO (Girls, 0-2 years) weight-for-recumbent length based on body measurements available as of 2020.  GENERAL: Active, alert,  no  distress.  SKIN: Clear. No significant rash, abnormal pigmentation or lesions.  HEAD: Normocephalic. Normal fontanels and sutures.  EYES: Conjunctivae and cornea normal. Red reflexes present bilaterally.  EARS: normal: no effusions, no erythema, normal landmarks  NOSE: Normal without discharge.  MOUTH/THROAT: Clear. No oral lesions.  NECK: Supple, no masses.  LYMPH NODES: No adenopathy  LUNGS: Clear. No rales, rhonchi, wheezing or retractions  HEART: Regular rate and rhythm. Normal S1/S2. No murmurs. Normal femoral pulses.  ABDOMEN: Soft, non-tender, not distended, no masses or hepatosplenomegaly. Normal umbilicus and bowel sounds.   GENITALIA: Normal female external genitalia. Ezequiel stage I,  No inguinal herniae are present.  EXTREMITIES: Hips normal with negative Ortolani and Diamond. Symmetric creases and  no deformities  NEUROLOGIC: Normal tone " throughout. Normal reflexes for age    ASSESSMENT/PLAN:   Well check  Anticipatory Guidance  The following topics were discussed:  SOCIAL/FAMILY    postpartum depression / fatigue  NUTRITION:    delay solid food    sucking needs/ pacifier  HEALTH/ SAFETY:    sleep habits    sleep on back    Preventive Care Plan  Immunizations     Reviewed, up to date  Referrals/Ongoing Specialty care: No   See other orders in [facility]    Resources:  [place] Child and Teen Checkups (C&TC) Schedule of Age-Related Screening Standards    FOLLOW-UP:      in 1 month for Preventive Care visit    Wesley Riojas MD  [facility]

## 2024-09-10 ENCOUNTER — TELEPHONE (OUTPATIENT)
Age: 29
End: 2024-09-10

## 2024-09-12 ENCOUNTER — HOSPITAL ENCOUNTER (EMERGENCY)
Facility: HOSPITAL | Age: 29
Discharge: HOME | End: 2024-09-12
Payer: COMMERCIAL

## 2024-09-12 VITALS
WEIGHT: 112 LBS | BODY MASS INDEX: 21.99 KG/M2 | TEMPERATURE: 98 F | DIASTOLIC BLOOD PRESSURE: 69 MMHG | HEIGHT: 60 IN | RESPIRATION RATE: 20 BRPM | SYSTOLIC BLOOD PRESSURE: 135 MMHG | HEART RATE: 77 BPM | OXYGEN SATURATION: 100 %

## 2024-09-12 DIAGNOSIS — O21.0 HYPEREMESIS GRAVIDARUM (HCC): Primary | ICD-10-CM

## 2024-09-12 LAB
ALBUMIN SERPL-MCNC: 4.4 G/DL (ref 3.2–4.8)
ALBUMIN/GLOB SERPL: 1.3 {RATIO} (ref 1–2)
ALP LIVER SERPL-CCNC: 47 U/L
ALT SERPL-CCNC: 17 U/L
ANION GAP SERPL CALC-SCNC: 10 MMOL/L (ref 0–18)
AST SERPL-CCNC: 21 U/L (ref ?–34)
BASOPHILS # BLD AUTO: 0.02 X10(3) UL (ref 0–0.2)
BASOPHILS NFR BLD AUTO: 0.1 %
BILIRUB SERPL-MCNC: 0.9 MG/DL (ref 0.3–1.2)
BILIRUB UR QL: NEGATIVE
BUN BLD-MCNC: 5 MG/DL (ref 9–23)
BUN/CREAT SERPL: 9.8 (ref 10–20)
CALCIUM BLD-MCNC: 9.5 MG/DL (ref 8.7–10.4)
CHLORIDE SERPL-SCNC: 107 MMOL/L (ref 98–112)
CO2 SERPL-SCNC: 22 MMOL/L (ref 21–32)
COLOR UR: YELLOW
CREAT BLD-MCNC: 0.51 MG/DL
DEPRECATED RDW RBC AUTO: 41 FL (ref 35.1–46.3)
EGFRCR SERPLBLD CKD-EPI 2021: 131 ML/MIN/1.73M2 (ref 60–?)
EOSINOPHIL # BLD AUTO: 0.01 X10(3) UL (ref 0–0.7)
EOSINOPHIL NFR BLD AUTO: 0.1 %
ERYTHROCYTE [DISTWIDTH] IN BLOOD BY AUTOMATED COUNT: 12.1 % (ref 11–15)
GLOBULIN PLAS-MCNC: 3.3 G/DL (ref 2–3.5)
GLUCOSE BLD-MCNC: 104 MG/DL (ref 70–99)
GLUCOSE UR-MCNC: 100 MG/DL
HCT VFR BLD AUTO: 36.9 %
HGB BLD-MCNC: 12.7 G/DL
HGB UR QL STRIP.AUTO: NEGATIVE
IMM GRANULOCYTES # BLD AUTO: 0.07 X10(3) UL (ref 0–1)
IMM GRANULOCYTES NFR BLD: 0.5 %
KETONES UR-MCNC: >150 MG/DL
LEUKOCYTE ESTERASE UR QL STRIP.AUTO: NEGATIVE
LIPASE SERPL-CCNC: 45 U/L (ref 12–53)
LYMPHOCYTES # BLD AUTO: 1.15 X10(3) UL (ref 1–4)
LYMPHOCYTES NFR BLD AUTO: 8.3 %
MCH RBC QN AUTO: 31.7 PG (ref 26–34)
MCHC RBC AUTO-ENTMCNC: 34.4 G/DL (ref 31–37)
MCV RBC AUTO: 92 FL
MONOCYTES # BLD AUTO: 0.41 X10(3) UL (ref 0.1–1)
MONOCYTES NFR BLD AUTO: 3 %
NEUTROPHILS # BLD AUTO: 12.2 X10 (3) UL (ref 1.5–7.7)
NEUTROPHILS # BLD AUTO: 12.2 X10(3) UL (ref 1.5–7.7)
NEUTROPHILS NFR BLD AUTO: 88 %
NITRITE UR QL STRIP.AUTO: NEGATIVE
OSMOLALITY SERPL CALC.SUM OF ELEC: 286 MOSM/KG (ref 275–295)
PH UR: 7.5 [PH] (ref 5–8)
PLATELET # BLD AUTO: 269 10(3)UL (ref 150–450)
POTASSIUM SERPL-SCNC: 3.6 MMOL/L (ref 3.5–5.1)
PROT SERPL-MCNC: 7.7 G/DL (ref 5.7–8.2)
PROT UR-MCNC: 20 MG/DL
RBC # BLD AUTO: 4.01 X10(6)UL
SODIUM SERPL-SCNC: 139 MMOL/L (ref 136–145)
SP GR UR STRIP: 1.02 (ref 1–1.03)
UROBILINOGEN UR STRIP-ACNC: NORMAL
WBC # BLD AUTO: 13.9 X10(3) UL (ref 4–11)

## 2024-09-12 PROCEDURE — 85025 COMPLETE CBC W/AUTO DIFF WBC: CPT

## 2024-09-12 PROCEDURE — 83690 ASSAY OF LIPASE: CPT | Performed by: NURSE PRACTITIONER

## 2024-09-12 PROCEDURE — S0028 INJECTION, FAMOTIDINE, 20 MG: HCPCS | Performed by: NURSE PRACTITIONER

## 2024-09-12 PROCEDURE — 96361 HYDRATE IV INFUSION ADD-ON: CPT

## 2024-09-12 PROCEDURE — 96374 THER/PROPH/DIAG INJ IV PUSH: CPT

## 2024-09-12 PROCEDURE — 99284 EMERGENCY DEPT VISIT MOD MDM: CPT

## 2024-09-12 PROCEDURE — 80053 COMPREHEN METABOLIC PANEL: CPT

## 2024-09-12 PROCEDURE — 96375 TX/PRO/DX INJ NEW DRUG ADDON: CPT

## 2024-09-12 PROCEDURE — 81001 URINALYSIS AUTO W/SCOPE: CPT | Performed by: NURSE PRACTITIONER

## 2024-09-12 RX ORDER — ONDANSETRON 4 MG/1
4 TABLET, ORALLY DISINTEGRATING ORAL ONCE
Status: DISCONTINUED | OUTPATIENT
Start: 2024-09-12 | End: 2024-09-12

## 2024-09-12 RX ORDER — DOXYLAMINE SUCCINATE AND PYRIDOXINE HYDROCHLORIDE, DELAYED RELEASE TABLETS 10 MG/10 MG 10; 10 MG/1; MG/1
2 TABLET, DELAYED RELEASE ORAL ONCE
Status: DISCONTINUED | OUTPATIENT
Start: 2024-09-12 | End: 2024-09-12

## 2024-09-12 RX ORDER — METOCLOPRAMIDE HYDROCHLORIDE 5 MG/ML
10 INJECTION INTRAMUSCULAR; INTRAVENOUS ONCE
Status: COMPLETED | OUTPATIENT
Start: 2024-09-12 | End: 2024-09-12

## 2024-09-12 RX ORDER — ONDANSETRON 2 MG/ML
4 INJECTION INTRAMUSCULAR; INTRAVENOUS ONCE
Status: COMPLETED | OUTPATIENT
Start: 2024-09-12 | End: 2024-09-12

## 2024-09-12 RX ORDER — FAMOTIDINE 10 MG/ML
20 INJECTION, SOLUTION INTRAVENOUS ONCE
Status: COMPLETED | OUTPATIENT
Start: 2024-09-12 | End: 2024-09-12

## 2024-09-12 RX ORDER — MAGNESIUM HYDROXIDE/ALUMINUM HYDROXICE/SIMETHICONE 120; 1200; 1200 MG/30ML; MG/30ML; MG/30ML
30 SUSPENSION ORAL ONCE
Status: DISCONTINUED | OUTPATIENT
Start: 2024-09-12 | End: 2024-09-12

## 2024-09-12 NOTE — ED QUICK NOTES
Patient decided she does not feel well enough to go home. Attempted PO fluids and vomited, ED provider, Jadon notified. Orders received

## 2024-09-12 NOTE — ED INITIAL ASSESSMENT (HPI)
Patient arrived in wheelchair to ED, A/O x 4, with complaints of constant N/V/D since this morning. Patient states she is 16 weeks pregnant. Patient states the happened a few weeks ago but resolved, now has returned. Patient states she is experiencing acid reflux and is having abdominal pain. Patient denies any vaginal discharge or bleeding. Patient doubled over and tearful during triage.

## 2024-09-12 NOTE — ED QUICK NOTES
Patient decided she does not want medications. Patient refused discharge paperwork and discharge teaching stating \"I just want to go home.\" Per ED provider patient ok to discharge. Patient in NAD, ABCs intact. Patient stable and ambulatory at discharge. Patient left department with all belongings.

## 2024-09-12 NOTE — ED PROVIDER NOTES
Patient Seen in: [facility] Emergency Department      History     Chief Complaint   Patient presents with    Nausea/Vomiting/Diarrhea     Stated Complaint: N/V/D, fever    Subjective:   29yof w hx of hx of anemia, anxiety, asthma currently 15 weeks pregnant by US reports with nausea, vomiting, and reflux. Started earlier today. Similar with multiple previous episodes this pregnancy. Reports she has ceased marijuana use since last ER visit. No fever, no hematemesis, no chest pain. No urinary symptoms. No flank pain.             Objective:   Past Medical History:    Anemia    Anxiety    Asthma (HCC)    Decorative tattoo    Depression    Genital herpes simplex              History reviewed. No pertinent surgical history.             Social History     Socioeconomic History    Marital status: Single   Tobacco Use    Smoking status: Former     Current packs/day: 0.00     Types: Cigarettes     Quit date:      Years since quittin.7     Passive exposure: Never    Smokeless tobacco: Former     Quit date:    Vaping Use    Vaping status: Never Used   Substance and Sexual Activity    Alcohol use: Not Currently     Comment: Social prior to pregnancy    Drug use: Yes     Types: Cannabis     Social Determinants of Health     Financial Resource Strain: Low Risk  (2024)    Financial Resource Strain     Difficulty of Paying Living Expenses: Somewhat hard     Med Affordability: No   Food Insecurity: Unknown (2024)    Food Insecurity     Food Insecurity: Patient declined   Transportation Needs: No Transportation Needs (2024)    Transportation Needs     Lack of Transportation: No   Stress: Stress Concern Present (2024)    Stress     Feeling of Stress : Yes   Housing Stability: Low Risk  (2024)    Housing Stability     Housing Instability: No              Review of Systems   All other systems reviewed and are negative.      Positive for stated Chief Complaint:  Nausea/Vomiting/Diarrhea    Other systems are as noted in HPI.  Constitutional and vital signs reviewed.      All other systems reviewed and negative except as noted above.    Physical Exam     ED Triage Vitals [09/12/24 1118]   /89   Pulse 89   Resp 25   Temp 97.5 °F (36.4 °C)   Temp src Temporal   SpO2 100 %   O2 Device None (Room air)       Current Vitals:   Vital Signs  BP: 120/82  Pulse: 80  Resp: 22  Temp: 97.5 °F (36.4 °C)  Temp src: Temporal  Fetal Heart Tones: 110    Oxygen Therapy  SpO2: 100 %  O2 Device: None (Room air)            Physical Exam  Vitals and nursing note reviewed.   Constitutional:       General: She is not in acute distress.     Appearance: She is well-developed.   HENT:      Head: Normocephalic and atraumatic.      Nose: Nose normal.      Mouth/Throat:      Mouth: Mucous membranes are moist.   Eyes:      Conjunctiva/sclera: Conjunctivae normal.      Pupils: Pupils are equal, round, and reactive to light.   Cardiovascular:      Rate and Rhythm: Normal rate and regular rhythm.      Heart sounds: Normal heart sounds.   Pulmonary:      Effort: Pulmonary effort is normal.      Breath sounds: Normal breath sounds.   Abdominal:      General: Bowel sounds are normal.      Palpations: Abdomen is soft.   Musculoskeletal:         General: No tenderness or deformity. Normal range of motion.      Cervical back: Normal range of motion and neck supple.   Skin:     General: Skin is warm and dry.      Capillary Refill: Capillary refill takes less than 2 seconds.      Findings: No rash.      Comments: Normal color   Neurological:      General: No focal deficit present.      Mental Status: She is alert and oriented to person, place, and time.      GCS: GCS eye subscore is 4. GCS verbal subscore is 5. GCS motor subscore is 6.      Cranial Nerves: No cranial nerve deficit.      Gait: Gait normal.               ED Course     Labs Reviewed   CBC WITH DIFFERENTIAL WITH PLATELET - Abnormal; Notable for the  following components:       Result Value    WBC 13.9 (*)     Neutrophil Absolute Prelim 12.20 (*)     Neutrophil Absolute 12.20 (*)     All other components within normal limits   COMP METABOLIC PANEL (14) - Abnormal; Notable for the following components:    Glucose 104 (*)     BUN 5 (*)     Creatinine 0.51 (*)     BUN/CREA Ratio 9.8 (*)     All other components within normal limits   URINALYSIS WITH CULTURE REFLEX - Abnormal; Notable for the following components:    Clarity Urine Ex.Turbid (*)     Glucose Urine 100 (*)     Ketones Urine >150 (*)     Protein Urine 20 (*)     All other components within normal limits   LIPASE - Normal   RAINBOW DRAW LAVENDER   RAINBOW DRAW LIGHT GREEN   RAINBOW DRAW BLUE   RAINBOW DRAW GOLD              [facility]                  Medical Decision Making  28yo/f w hx and exam as stated; vomiting, pregnant    Vomiting stopped  Labs non acute  No pain  Normal fht    After 1st round of medications patient stated she wants to go home, wants no further treatment    Amount and/or Complexity of Data Reviewed  Labs:  Decision-making details documented in ED Course.    Risk  OTC drugs.  Prescription drug management.        Disposition and Plan     Clinical Impression:  1. Hyperemesis gravidarum (HCC)         Disposition:  Discharge  9/12/2024  1:07 pm    Follow-up:  Shaggy Hickey DO  [address] [facility] [id number] [phone]    Follow up in 2 day(s)            Medications Prescribed:  Current Discharge Medication List

## 2024-09-13 ENCOUNTER — HOSPITAL ENCOUNTER (EMERGENCY)
Facility: HOSPITAL | Age: 29
Discharge: HOME | End: 2024-09-13
Attending: EMERGENCY MEDICINE
Payer: COMMERCIAL

## 2024-09-13 VITALS
RESPIRATION RATE: 18 BRPM | TEMPERATURE: 98 F | SYSTOLIC BLOOD PRESSURE: 141 MMHG | DIASTOLIC BLOOD PRESSURE: 97 MMHG | OXYGEN SATURATION: 100 % | HEART RATE: 75 BPM

## 2024-09-13 DIAGNOSIS — O21.0 HYPEREMESIS GRAVIDARUM (HCC): Primary | ICD-10-CM

## 2024-09-13 DIAGNOSIS — E87.6 HYPOKALEMIA: ICD-10-CM

## 2024-09-13 LAB
ALBUMIN SERPL-MCNC: 4.3 G/DL (ref 3.2–4.8)
ALBUMIN/GLOB SERPL: 1.3 {RATIO} (ref 1–2)
ALP LIVER SERPL-CCNC: 47 U/L
ALT SERPL-CCNC: 20 U/L
ANION GAP SERPL CALC-SCNC: 12 MMOL/L (ref 0–18)
AST SERPL-CCNC: 22 U/L (ref ?–34)
BASOPHILS # BLD AUTO: 0.03 X10(3) UL (ref 0–0.2)
BASOPHILS NFR BLD AUTO: 0.2 %
BILIRUB SERPL-MCNC: 1.5 MG/DL (ref 0.3–1.2)
BUN BLD-MCNC: 6 MG/DL (ref 9–23)
BUN/CREAT SERPL: 11.8 (ref 10–20)
CALCIUM BLD-MCNC: 9.5 MG/DL (ref 8.7–10.4)
CHLORIDE SERPL-SCNC: 104 MMOL/L (ref 98–112)
CO2 SERPL-SCNC: 21 MMOL/L (ref 21–32)
CREAT BLD-MCNC: 0.51 MG/DL
DEPRECATED RDW RBC AUTO: 40.6 FL (ref 35.1–46.3)
EGFRCR SERPLBLD CKD-EPI 2021: 131 ML/MIN/1.73M2 (ref 60–?)
EOSINOPHIL # BLD AUTO: 0.02 X10(3) UL (ref 0–0.7)
EOSINOPHIL NFR BLD AUTO: 0.1 %
ERYTHROCYTE [DISTWIDTH] IN BLOOD BY AUTOMATED COUNT: 12.3 % (ref 11–15)
GLOBULIN PLAS-MCNC: 3.3 G/DL (ref 2–3.5)
GLUCOSE BLD-MCNC: 94 MG/DL (ref 70–99)
HCT VFR BLD AUTO: 35.1 %
HGB BLD-MCNC: 12.3 G/DL
IMM GRANULOCYTES # BLD AUTO: 0.09 X10(3) UL (ref 0–1)
IMM GRANULOCYTES NFR BLD: 0.6 %
LYMPHOCYTES # BLD AUTO: 2.09 X10(3) UL (ref 1–4)
LYMPHOCYTES NFR BLD AUTO: 13.3 %
MAGNESIUM SERPL-MCNC: 1.9 MG/DL (ref 1.6–2.6)
MCH RBC QN AUTO: 31.9 PG (ref 26–34)
MCHC RBC AUTO-ENTMCNC: 35 G/DL (ref 31–37)
MCV RBC AUTO: 90.9 FL
MONOCYTES # BLD AUTO: 1.05 X10(3) UL (ref 0.1–1)
MONOCYTES NFR BLD AUTO: 6.7 %
NEUTROPHILS # BLD AUTO: 12.38 X10 (3) UL (ref 1.5–7.7)
NEUTROPHILS # BLD AUTO: 12.38 X10(3) UL (ref 1.5–7.7)
NEUTROPHILS NFR BLD AUTO: 79.1 %
OSMOLALITY SERPL CALC.SUM OF ELEC: 281 MOSM/KG (ref 275–295)
PLATELET # BLD AUTO: 299 10(3)UL (ref 150–450)
POTASSIUM SERPL-SCNC: 3.1 MMOL/L (ref 3.5–5.1)
PROT SERPL-MCNC: 7.6 G/DL (ref 5.7–8.2)
RBC # BLD AUTO: 3.86 X10(6)UL
SODIUM SERPL-SCNC: 137 MMOL/L (ref 136–145)
WBC # BLD AUTO: 15.7 X10(3) UL (ref 4–11)

## 2024-09-13 PROCEDURE — S0028 INJECTION, FAMOTIDINE, 20 MG: HCPCS | Performed by: EMERGENCY MEDICINE

## 2024-09-13 PROCEDURE — 85025 COMPLETE CBC W/AUTO DIFF WBC: CPT | Performed by: EMERGENCY MEDICINE

## 2024-09-13 PROCEDURE — 99284 EMERGENCY DEPT VISIT MOD MDM: CPT

## 2024-09-13 PROCEDURE — 96361 HYDRATE IV INFUSION ADD-ON: CPT

## 2024-09-13 PROCEDURE — 96375 TX/PRO/DX INJ NEW DRUG ADDON: CPT

## 2024-09-13 PROCEDURE — 83735 ASSAY OF MAGNESIUM: CPT | Performed by: EMERGENCY MEDICINE

## 2024-09-13 PROCEDURE — S0119 ONDANSETRON 4 MG: HCPCS

## 2024-09-13 PROCEDURE — 80053 COMPREHEN METABOLIC PANEL: CPT | Performed by: EMERGENCY MEDICINE

## 2024-09-13 PROCEDURE — 96374 THER/PROPH/DIAG INJ IV PUSH: CPT

## 2024-09-13 RX ORDER — ONDANSETRON 4 MG/1
4 TABLET, ORALLY DISINTEGRATING ORAL ONCE
Status: COMPLETED | OUTPATIENT
Start: 2024-09-13 | End: 2024-09-13

## 2024-09-13 RX ORDER — METOCLOPRAMIDE HYDROCHLORIDE 5 MG/ML
10 INJECTION INTRAMUSCULAR; INTRAVENOUS ONCE
Status: COMPLETED | OUTPATIENT
Start: 2024-09-13 | End: 2024-09-13

## 2024-09-13 RX ORDER — SUCRALFATE 1 G/1
1 TABLET ORAL
Qty: 120 TABLET | Refills: 0 | Status: SHIPPED | OUTPATIENT
Start: 2024-09-13 | End: 2024-10-13

## 2024-09-13 RX ORDER — ONDANSETRON 2 MG/ML
4 INJECTION INTRAMUSCULAR; INTRAVENOUS ONCE
Status: COMPLETED | OUTPATIENT
Start: 2024-09-13 | End: 2024-09-13

## 2024-09-13 RX ORDER — POTASSIUM CHLORIDE 1500 MG/1
20 TABLET, EXTENDED RELEASE ORAL 2 TIMES DAILY
Qty: 6 TABLET | Refills: 0 | Status: SHIPPED | OUTPATIENT
Start: 2024-09-13 | End: 2024-09-16

## 2024-09-13 RX ORDER — ONDANSETRON 4 MG/1
TABLET, ORALLY DISINTEGRATING ORAL
Status: COMPLETED
Start: 2024-09-13 | End: 2024-09-13

## 2024-09-13 RX ORDER — DOXYLAMINE SUCCINATE AND PYRIDOXINE HYDROCHLORIDE, DELAYED RELEASE TABLETS 10 MG/10 MG 10; 10 MG/1; MG/1
2 TABLET, DELAYED RELEASE ORAL NIGHTLY
Qty: 120 TABLET | Refills: 0 | Status: SHIPPED | OUTPATIENT
Start: 2024-09-13

## 2024-09-13 RX ORDER — ONDANSETRON 4 MG/1
4 TABLET, ORALLY DISINTEGRATING ORAL EVERY 4 HOURS PRN
Qty: 10 TABLET | Refills: 0 | Status: SHIPPED | OUTPATIENT
Start: 2024-09-13 | End: 2024-09-20

## 2024-09-13 RX ORDER — FAMOTIDINE 10 MG/ML
20 INJECTION, SOLUTION INTRAVENOUS ONCE
Status: COMPLETED | OUTPATIENT
Start: 2024-09-13 | End: 2024-09-13

## 2024-09-14 NOTE — ED INITIAL ASSESSMENT (HPI)
S: pt presents to ED with hyperemsis gravidarum 17 weeks pregnant. Pt states that the diclegis and zofran were working but she ran out of her prescription.

## 2024-09-14 NOTE — ED PROVIDER NOTES
Patient Seen in: [facility] Emergency Department      History     Chief Complaint   Patient presents with    Hyperemesis Gravidarum     Stated Complaint: acid reflux, pregnant. vomiting    Subjective:   HPI    29-year-old female 17 weeks pregnant presents for evaluation for nausea vomiting and acid reflux.  Patient reports that she seen here yesterday for the same.  Pain is epigastric.  She denies any fevers, chills but does report some sweating episodes at home.  She denies any vaginal bleeding or discharge.  She denies diarrhea or constipation.    Objective:   Past Medical History:    Anemia    Anxiety    Asthma (HCC)    Decorative tattoo    Depression    Genital herpes simplex              History reviewed. No pertinent surgical history.             Social History     Socioeconomic History    Marital status: Single   Tobacco Use    Smoking status: Former     Current packs/day: 0.00     Types: Cigarettes     Quit date:      Years since quittin.7     Passive exposure: Never    Smokeless tobacco: Former     Quit date:    Vaping Use    Vaping status: Never Used   Substance and Sexual Activity    Alcohol use: Not Currently     Comment: Social prior to pregnancy    Drug use: Yes     Types: Cannabis     Social Determinants of Health     Financial Resource Strain: Low Risk  (2024)    Financial Resource Strain     Difficulty of Paying Living Expenses: Somewhat hard     Med Affordability: No   Food Insecurity: Unknown (2024)    Food Insecurity     Food Insecurity: Patient declined   Transportation Needs: No Transportation Needs (2024)    Transportation Needs     Lack of Transportation: No   Stress: Stress Concern Present (2024)    Stress     Feeling of Stress : Yes   Housing Stability: Low Risk  (2024)    Housing Stability     Housing Instability: No              Review of Systems    Positive for stated Chief Complaint: Hyperemesis Gravidarum    Other systems are as noted in  HPI.  Constitutional and vital signs reviewed.      All other systems reviewed and negative except as noted above.    Physical Exam     ED Triage Vitals [09/13/24 1936]   /81   Pulse 96   Resp 20   Temp 97.8 °F (36.6 °C)   Temp src    SpO2 98 %   O2 Device None (Room air)       Current Vitals:   Vital Signs  BP: 135/86  Pulse: 86  Resp: 18  Temp: 97.8 °F (36.6 °C)  MAP (mmHg): 100    Oxygen Therapy  SpO2: 100 %  O2 Device: None (Room air)            Physical Exam  Vitals and nursing note reviewed.   Constitutional:       Appearance: Normal appearance.   HENT:      Head: Normocephalic and atraumatic.   Eyes:      Extraocular Movements: Extraocular movements intact.      Pupils: Pupils are equal, round, and reactive to light.   Cardiovascular:      Rate and Rhythm: Normal rate and regular rhythm.      Pulses: Normal pulses.   Pulmonary:      Effort: Pulmonary effort is normal.      Breath sounds: Normal breath sounds.   Abdominal:      General: Bowel sounds are normal.      Palpations: Abdomen is soft.      Tenderness: There is no abdominal tenderness. There is no left CVA tenderness or guarding.   Musculoskeletal:         General: Normal range of motion.      Cervical back: Normal range of motion.   Skin:     General: Skin is warm and dry.   Neurological:      General: No focal deficit present.      Mental Status: She is alert.               ED Course     Labs Reviewed   COMP METABOLIC PANEL (14) - Abnormal; Notable for the following components:       Result Value    Potassium 3.1 (*)     BUN 6 (*)     Creatinine 0.51 (*)     Bilirubin, Total 1.5 (*)     All other components within normal limits   CBC WITH DIFFERENTIAL WITH PLATELET - Abnormal; Notable for the following components:    WBC 15.7 (*)     Neutrophil Absolute Prelim 12.38 (*)     Neutrophil Absolute 12.38 (*)     Monocyte Absolute 1.05 (*)     All other components within normal limits   MAGNESIUM - Normal                      MDM                                          Medical Decision Making  Differential diagnosis includes but is not limited to hyperemesis gravidarum, gastritis, electrolyte disturbance, etc.    CBC shows a leukocytosis which is likely reactive.  Chemistry with mild hypokalemia.  Patient was feeling better after 2 L of IV fluids, Zofran, Reglan, GI cocktail, and IV Pepcid.  Patient's abdominal exam is benign.  Patient we discharged home with prescriptions for Diclegis, Zofran, potassium and sucralfate.  Return precautions given.  Follow-up encouraged.    Medical Record Review: I personally reviewed available prior medical records for any recent pertinent discharge summaries, testing, and procedures, and reviewed those reports.    Complicating factors: The patient  has a past medical history of Anemia, Anxiety, Asthma (HCC), Decorative tattoo, Depression, and Genital herpes simplex. and  has no past surgical history on file. that contribute to the medical complexity of this ED evaluation.     Clinical impression as well as any lab results and radiology findings were discussed with the patient and/or caregiver. I personally reviewed all laboratory results and radiology images myself. Patient is advised to follow up with PCP for reevaluation. I provided discharge instructions and return precautions. Patient and/or caregiver voices understanding and agreement with the treatment plan. All questions were addressed and answered.         Problems Addressed:  Hyperemesis gravidarum (HCC): acute illness or injury with systemic symptoms  Hypokalemia: acute illness or injury    Amount and/or Complexity of Data Reviewed  External Data Reviewed: labs.     Details: Labs from yesterday reviewed, patient's white blood was 13.9.  Chemistry was normal.  Labs: ordered. Decision-making details documented in ED Course.    Risk  Prescription drug management.        Disposition and Plan     Clinical Impression:  1. Hyperemesis gravidarum (HCC)    2. Hypokalemia          Disposition:  Discharge  9/13/2024  9:55 pm    Follow-up:  No follow-up provider specified.        Medications Prescribed:  Current Discharge Medication List        START taking these medications    Details   !! doxylamine-pyridoxine 10-10 MG Oral Tab EC Take 2 tablets by mouth nightly.  Qty: 120 tablet, Refills: 0      sucralfate 1 g Oral Tab Take 1 tablet (1 g total) by mouth 4 (four) times daily before meals and nightly.  Qty: 120 tablet, Refills: 0       !! - Potential duplicate medications found. Please discuss with provider.

## 2024-09-18 ENCOUNTER — TELEPHONE (OUTPATIENT)
Dept: PERINATAL CARE | Facility: HOSPITAL | Age: 29
End: 2024-09-18

## 2024-09-18 NOTE — TELEPHONE ENCOUNTER
[facility] screening results  Reviewed by MFM DR han    Low Risk for Aneuploidies, triploidy and Monosomy X and 22 q deletion  Fetal Sex: male        My chart message sent  Copy of results sent for scanning into pt record

## 2024-09-20 ENCOUNTER — TELEPHONE (OUTPATIENT)
Dept: OBGYN CLINIC | Facility: CLINIC | Age: 29
End: 2024-09-20

## 2024-09-20 RX ORDER — ONDANSETRON 4 MG/1
4 TABLET, FILM COATED ORAL EVERY 8 HOURS PRN
Qty: 30 TABLET | Refills: 0 | Status: SHIPPED | OUTPATIENT
Start: 2024-09-20

## 2024-09-20 RX ORDER — METRONIDAZOLE 500 MG/1
500 TABLET ORAL 2 TIMES DAILY
Qty: 14 TABLET | Refills: 0 | Status: SHIPPED | OUTPATIENT
Start: 2024-09-20

## 2024-09-20 NOTE — TELEPHONE ENCOUNTER
Patient called states she was given Dosran in the ER and she wants to know if she can get a refill. Please call

## 2024-09-20 NOTE — TELEPHONE ENCOUNTER
Patient calling for refill of Zofran which she was given in the ER from last week. Patient states she is having some nausea and waking in the middle of the night with nausea, no vomiting since last week in ER visit for Hyperemesis 9/13/24. Patient states she needs to take a Zofran before work due to nausea, patient works 4-11pm.    Patient aware of bacterial vaginosis on pap, patient states she did notice a fishy odor this morning. Patient would like prescription to be called in, see note from pap done 8/27/24.    ----- Message from Shaggy Hickey sent at 9/19/2024 11:05 AM CDT -----  +bacterial vaginosis on pap.  Prenatal patient.  If symptoms, ok for flagyl     Prescription for flagyl sent to pharm as per Dr. Hickey order.    To Dr. Deshpande on call for refill on Zofran

## 2024-09-20 NOTE — TELEPHONE ENCOUNTER
Patient was given Zofran 10 tabs at ER 9/13/24, Do we send refill to pharm with 10 tabs or should we order #30?    To Dr. Deshpande on call for recommendations

## 2024-09-20 NOTE — TELEPHONE ENCOUNTER
Zofran #30 with no refills sent to pharm, patient aware. Patient aware while taking Flagyl no intercourse and no alcohol.

## 2024-09-20 NOTE — TELEPHONE ENCOUNTER
Unable to leave message d/t voicemail not being set up.         ----- Message from Shaggy Hickey sent at 9/19/2024 11:05 AM CDT -----  +bacterial vaginosis on pap.  Prenatal patient.  If symptoms, ok for flagyl

## 2024-09-25 ENCOUNTER — ROUTINE PRENATAL (OUTPATIENT)
Dept: OBGYN CLINIC | Facility: CLINIC | Age: 29
End: 2024-09-25
Payer: COMMERCIAL

## 2024-09-25 VITALS
HEART RATE: 75 BPM | DIASTOLIC BLOOD PRESSURE: 70 MMHG | SYSTOLIC BLOOD PRESSURE: 108 MMHG | WEIGHT: 108.81 LBS | BODY MASS INDEX: 21 KG/M2

## 2024-09-25 DIAGNOSIS — Z34.80 ENCOUNTER FOR SUPERVISION OF OTHER NORMAL PREGNANCY, UNSPECIFIED TRIMESTER (HCC): Primary | ICD-10-CM

## 2024-09-25 LAB
GLUCOSE (URINE DIPSTICK): NEGATIVE MG/DL
KETONES (URINE DIPSTICK): 40 MG/DL
MULTISTIX LOT#: ABNORMAL NUMERIC
NITRITE, URINE: NEGATIVE
OCCULT BLOOD: NEGATIVE
PH, URINE: 6.5 (ref 4.5–8)
PROTEIN (URINE DIPSTICK): NEGATIVE MG/DL
SPECIFIC GRAVITY: 1.02 (ref 1–1.03)
UROBILINOGEN,SEMI-QN: 0.2 MG/DL (ref 0–1.9)

## 2024-09-25 PROCEDURE — 81002 URINALYSIS NONAUTO W/O SCOPE: CPT | Performed by: OBSTETRICS & GYNECOLOGY

## 2024-09-25 NOTE — PROGRESS NOTES
Lost 4 pounds due to xs vomitting from GERD -- was in ER multiple times. Stopped marijuane but then states used alittle yesterday. Needs random urine tox. Has not spoken to [facility] -- will call them.

## 2024-10-01 NOTE — PROGRESS NOTES
Outpatient Maternal-Fetal Medicine Follow-Up    Dear Dr. Angel    Thank you for requesting an ultrasound and maternal-fetal medicine consultation on your patient Valeria Hayes.  As you are aware she is a 29 year old female  with a sanford pregnancy and an Estimated Date of Delivery: 25.  She returned to maternal-fetal medicine today for a follow-up visit.  Her history was reviewed from her prior visit and there were no interval changes.    Antepartum Risk Factors  Cannabis Use In Pregnancy    PHYSICAL EXAMINATION:  /65   Pulse 93   Wt 108 lb (49 kg)   LMP 2024 (Approximate)   BMI 21.09 kg/m²   General: alert and oriented, no acute distress  Abdomen: gravid, soft, non-tender  Extremities: non-tender, no edema    OBSTETRIC ULTRASOUND  The patient had a level II ultrasound today which revealed size consistent with dates and a normal detailed anatomic survey.      Ultrasound Findings:  Single IUP in cephalic presentation.    Placenta is posterior.   A 3 vessel cord is noted.  Cardiac activity is present at 153 bpm   g ( 0 lb 13 oz);   MVP is 4.6 cm .     The fetal measurements are consistent with the established EDC. No ultrasound evidence of structural abnormalities are seen today. The nasal bone is present. No ultrasound evidence of markers for aneuploidy are seen. She understands that ultrasound exam cannot exclude genetic abnormalities and that genetic testing is recommended. The limitations of ultrasound were discussed.     Uterus and adnexa appeared normal  today on US    I interpreted the results and reviewed them with the patient.    DISCUSSION  During her visit we discussed and reviewed the following issues:  CANNABIS USE IN PREGNANCY  See prior [facility] notes for a detailed review.    IMPRESSION:  IUP at 20w4d  Cannabis Use In Pregnancy    RECOMMENDATIONS:  Continue care with Dr. Angel  Follow-up growth & BPP ultrasound at 32 weeks.  Weekly NST's at 36 weeks.    Thank you for  allowing me to participate in the care of your patient.  Please do not hesitate to contact me if additional questions or concerns arise.      Alonso Brunson M.D.    40 minutes spent in review of records, patient consultation, documentation and coordination of care.  The relevant clinical matter(s) are summarized above.     Note to patient and family  The 21st Century Cures Act makes medical notes available to patients in the interest of transparency.  However, please be advised that this is a medical document.  It is intended as heoh-gx-sewj communication.  It is written and medical language may contain abbreviations or verbiage that are technical and unfamiliar.  It may appear blunt or direct.  Medical documents are intended to carry relevant information, facts as evident, and the clinical opinion of the practitioner.

## 2024-10-03 ENCOUNTER — TELEPHONE (OUTPATIENT)
Dept: OBGYN CLINIC | Facility: CLINIC | Age: 29
End: 2024-10-03

## 2024-10-03 RX ORDER — ONDANSETRON 4 MG/1
4 TABLET, FILM COATED ORAL EVERY 8 HOURS PRN
Qty: 30 TABLET | Refills: 0 | Status: SHIPPED | OUTPATIENT
Start: 2024-10-03

## 2024-10-03 NOTE — TELEPHONE ENCOUNTER
RN spoke with patient. She reports she still feels nauseous, but has not had any vomiting episodes with the Zofran. Patient states she works 10am-10pm. She reports she usually will take one at 5:30am and go back to bed.   Patient requesting refill. Patient informed a message will be routed to Dr. Deshpande (on-call) for review. Patient verbalized understanding.     Patient last SHERRI was with Dr. Diego on 9/25/24. See Dr. Diego office notes below:  Lost 4 pounds due to xs vomitting from GERD -- was in ER multiple times. Stopped marijuane but then states used alittle yesterday. Needs random urine tox. Has not spoken to [facility] -- will call them.        Message to Dr. Deshpande (on-call) to please review and advise if okay for refills on Zofran. Thank you.

## 2024-10-03 NOTE — TELEPHONE ENCOUNTER
Current Outpatient Medications   Medication Sig Dispense Refill      14 tablet 0    ondansetron (ZOFRAN) 4 mg tablet Take 1 tablet (4 mg total) by mouth every 8 (eight) hours as needed for Nausea. 30 tablet 0

## 2024-10-03 NOTE — TELEPHONE ENCOUNTER
RN called patient, but was unable to leave a voice message since her mail box has not been set up yet. [email or web address] message sent.

## 2024-10-08 ENCOUNTER — HOSPITAL ENCOUNTER (OUTPATIENT)
Dept: PERINATAL CARE | Facility: HOSPITAL | Age: 29
Discharge: HOME | End: 2024-10-08
Attending: OBSTETRICS & GYNECOLOGY
Payer: COMMERCIAL

## 2024-10-08 VITALS
SYSTOLIC BLOOD PRESSURE: 111 MMHG | WEIGHT: 108 LBS | DIASTOLIC BLOOD PRESSURE: 65 MMHG | HEART RATE: 93 BPM | BODY MASS INDEX: 21 KG/M2

## 2024-10-08 DIAGNOSIS — O98.311: ICD-10-CM

## 2024-10-08 DIAGNOSIS — F41.9 ANXIETY AND DEPRESSION: ICD-10-CM

## 2024-10-08 DIAGNOSIS — O99.320 MARIJUANA USE DURING PREGNANCY (HCC): ICD-10-CM

## 2024-10-08 DIAGNOSIS — F32.A ANXIETY AND DEPRESSION: ICD-10-CM

## 2024-10-08 DIAGNOSIS — Z36.89 ENCOUNTER FOR FETAL ANATOMIC SURVEY (HCC): Primary | ICD-10-CM

## 2024-10-08 DIAGNOSIS — Z13.79 GENETIC TESTING: ICD-10-CM

## 2024-10-08 DIAGNOSIS — F12.90 MARIJUANA USE DURING PREGNANCY (HCC): ICD-10-CM

## 2024-10-08 DIAGNOSIS — A60.09: ICD-10-CM

## 2024-10-08 DIAGNOSIS — Z3A.14 14 WEEKS GESTATION OF PREGNANCY (HCC): ICD-10-CM

## 2024-10-08 DIAGNOSIS — Z36.89 ENCOUNTER FOR FETAL ANATOMIC SURVEY (HCC): ICD-10-CM

## 2024-10-08 PROCEDURE — 76811 OB US DETAILED SNGL FETUS: CPT | Performed by: OBSTETRICS & GYNECOLOGY

## 2024-10-11 ENCOUNTER — TELEPHONE (OUTPATIENT)
Dept: OBGYN CLINIC | Facility: CLINIC | Age: 29
End: 2024-10-11

## 2024-10-11 DIAGNOSIS — O99.320 MARIJUANA USE DURING PREGNANCY (HCC): ICD-10-CM

## 2024-10-11 DIAGNOSIS — F12.90 MARIJUANA USE DURING PREGNANCY (HCC): ICD-10-CM

## 2024-10-11 NOTE — TELEPHONE ENCOUNTER
Patient has appointment scheduled on Monday with endo, patient needs a clearance note to get an xray done. Please advise

## 2024-10-21 ENCOUNTER — ROUTINE PRENATAL (OUTPATIENT)
Dept: OBGYN CLINIC | Facility: CLINIC | Age: 29
End: 2024-10-21
Payer: COMMERCIAL

## 2024-10-21 VITALS
HEART RATE: 92 BPM | WEIGHT: 111.81 LBS | DIASTOLIC BLOOD PRESSURE: 69 MMHG | BODY MASS INDEX: 22 KG/M2 | SYSTOLIC BLOOD PRESSURE: 102 MMHG

## 2024-10-21 DIAGNOSIS — Z34.91 ENCOUNTER FOR SUPERVISION OF NORMAL PREGNANCY IN FIRST TRIMESTER, UNSPECIFIED GRAVIDITY (HCC): Primary | ICD-10-CM

## 2024-10-21 LAB
APPEARANCE: CLEAR
BILIRUBIN: NEGATIVE
GLUCOSE (URINE DIPSTICK): NEGATIVE MG/DL
KETONES (URINE DIPSTICK): NEGATIVE MG/DL
LEUKOCYTES: NEGATIVE
MULTISTIX LOT#: NORMAL NUMERIC
NITRITE, URINE: NEGATIVE
OCCULT BLOOD: NEGATIVE
PH, URINE: 7.5 (ref 4.5–8)
PROTEIN (URINE DIPSTICK): NEGATIVE MG/DL
SPECIFIC GRAVITY: 1.01 (ref 1–1.03)
URINE-COLOR: YELLOW
UROBILINOGEN,SEMI-QN: 0.2 MG/DL (ref 0–1.9)

## 2024-11-20 ENCOUNTER — ROUTINE PRENATAL (OUTPATIENT)
Dept: OBGYN CLINIC | Facility: CLINIC | Age: 29
End: 2024-11-20
Payer: COMMERCIAL

## 2024-11-20 VITALS
BODY MASS INDEX: 24 KG/M2 | SYSTOLIC BLOOD PRESSURE: 105 MMHG | HEART RATE: 93 BPM | DIASTOLIC BLOOD PRESSURE: 70 MMHG | WEIGHT: 123 LBS

## 2024-11-20 DIAGNOSIS — Z34.92 ENCOUNTER FOR SUPERVISION OF NORMAL PREGNANCY IN SECOND TRIMESTER, UNSPECIFIED GRAVIDITY (HCC): Primary | ICD-10-CM

## 2024-11-20 LAB
BILIRUBIN: NEGATIVE
GLUCOSE (URINE DIPSTICK): NEGATIVE MG/DL
KETONES (URINE DIPSTICK): NEGATIVE MG/DL
LEUKOCYTES: NEGATIVE
MULTISTIX LOT#: 57 NUMERIC
NITRITE, URINE: NEGATIVE
OCCULT BLOOD: NEGATIVE
PH, URINE: 6 (ref 4.5–8)
PROTEIN (URINE DIPSTICK): NEGATIVE MG/DL
SPECIFIC GRAVITY: 1.01 (ref 1–1.03)
UROBILINOGEN,SEMI-QN: 0.2 MG/DL (ref 0–1.9)

## 2024-11-20 PROCEDURE — 81002 URINALYSIS NONAUTO W/O SCOPE: CPT | Performed by: OBSTETRICS & GYNECOLOGY

## 2024-11-21 NOTE — PROGRESS NOTES
Rodriguez at visit. They will be back in [place] for 3 weeks starting 12-23. Plan visits just before and then just after return. No S/S of PTL. ASks for 2T labs as [facility] order.

## 2024-11-25 RX ORDER — DOXYLAMINE SUCCINATE AND PYRIDOXINE HYDROCHLORIDE, DELAYED RELEASE TABLETS 10 MG/10 MG 10; 10 MG/1; MG/1
2 TABLET, DELAYED RELEASE ORAL NIGHTLY
Qty: 120 TABLET | Refills: 0 | OUTPATIENT
Start: 2024-11-25

## 2024-12-04 ENCOUNTER — ROUTINE PRENATAL (OUTPATIENT)
Dept: OBGYN CLINIC | Facility: CLINIC | Age: 29
End: 2024-12-04
Payer: COMMERCIAL

## 2024-12-04 VITALS
HEART RATE: 85 BPM | SYSTOLIC BLOOD PRESSURE: 105 MMHG | WEIGHT: 125.63 LBS | DIASTOLIC BLOOD PRESSURE: 66 MMHG | BODY MASS INDEX: 25 KG/M2

## 2024-12-04 DIAGNOSIS — Z34.92 ENCOUNTER FOR SUPERVISION OF NORMAL PREGNANCY IN SECOND TRIMESTER, UNSPECIFIED GRAVIDITY (HCC): Primary | ICD-10-CM

## 2024-12-04 LAB
APPEARANCE: CLEAR
BILIRUBIN: NEGATIVE
GLUCOSE (URINE DIPSTICK): NEGATIVE MG/DL
KETONES (URINE DIPSTICK): NEGATIVE MG/DL
MULTISTIX LOT#: ABNORMAL NUMERIC
NITRITE, URINE: NEGATIVE
OCCULT BLOOD: NEGATIVE
PH, URINE: 8 (ref 4.5–8)
PROTEIN (URINE DIPSTICK): NEGATIVE MG/DL
SPECIFIC GRAVITY: 1.01 (ref 1–1.03)
URINE-COLOR: YELLOW
UROBILINOGEN,SEMI-QN: 0.2 MG/DL (ref 0–1.9)

## 2024-12-04 PROCEDURE — 81002 URINALYSIS NONAUTO W/O SCOPE: CPT | Performed by: OBSTETRICS & GYNECOLOGY

## 2024-12-04 NOTE — PROGRESS NOTES
Has not done 28 week labs.  Will be travelling at end of month for 20 days.  Discussed Tdap and RSV.  RTC 2 week  Please call your insurance about RSV vaccine coverage called [employer]. You are eligible to receive if you are 32 weeks 0 days until 36 weeks 6 days during Sept 1st-Jan 31st. A fee waiver for $683 will be required to be signed even if your insurance states it will cover. Newborns may also receive the vaccine (Beyfortus) after delivery however pricing is estimated to be higher ($1200).

## 2024-12-06 ENCOUNTER — LAB ENCOUNTER (OUTPATIENT)
Dept: LAB | Facility: HOSPITAL | Age: 29
End: 2024-12-06
Attending: OBSTETRICS & GYNECOLOGY
Payer: COMMERCIAL

## 2024-12-06 LAB
DEPRECATED RDW RBC AUTO: 40.6 FL (ref 35.1–46.3)
ERYTHROCYTE [DISTWIDTH] IN BLOOD BY AUTOMATED COUNT: 11.7 % (ref 11–15)
GLUCOSE 1H P GLC SERPL-MCNC: 108 MG/DL
HCT VFR BLD AUTO: 29.9 %
HGB BLD-MCNC: 10.2 G/DL
MCH RBC QN AUTO: 32.2 PG (ref 26–34)
MCHC RBC AUTO-ENTMCNC: 34.1 G/DL (ref 31–37)
MCV RBC AUTO: 94.3 FL
PLATELET # BLD AUTO: 198 10(3)UL (ref 150–450)
RBC # BLD AUTO: 3.17 X10(6)UL
WBC # BLD AUTO: 8.4 X10(3) UL (ref 4–11)

## 2024-12-06 PROCEDURE — 36415 COLL VENOUS BLD VENIPUNCTURE: CPT | Performed by: OBSTETRICS & GYNECOLOGY

## 2024-12-06 PROCEDURE — 82950 GLUCOSE TEST: CPT | Performed by: OBSTETRICS & GYNECOLOGY

## 2024-12-06 PROCEDURE — 85027 COMPLETE CBC AUTOMATED: CPT | Performed by: OBSTETRICS & GYNECOLOGY

## 2024-12-16 ENCOUNTER — TELEPHONE (OUTPATIENT)
Dept: OBGYN CLINIC | Facility: CLINIC | Age: 29
End: 2024-12-16

## 2024-12-16 DIAGNOSIS — O99.013 ANEMIA DURING PREGNANCY IN THIRD TRIMESTER (HCC): Primary | ICD-10-CM

## 2024-12-16 NOTE — TELEPHONE ENCOUNTER
Pt informed of [facility] recs. Advised to purchase slow fe OTC and take a different time of day from when she takes PNV. Cbc and ferritin labs ordered. Pt will repeat in  4 weeks.

## 2024-12-16 NOTE — TELEPHONE ENCOUNTER
----- Message from [place] sent at 12/8/2024  6:57 PM CST -----  Needs to start iron and repeat cbc and ferritin in 4 weeks

## 2024-12-18 ENCOUNTER — ROUTINE PRENATAL (OUTPATIENT)
Dept: OBGYN CLINIC | Facility: CLINIC | Age: 29
End: 2024-12-18
Payer: COMMERCIAL

## 2024-12-18 VITALS
WEIGHT: 130.63 LBS | DIASTOLIC BLOOD PRESSURE: 69 MMHG | SYSTOLIC BLOOD PRESSURE: 104 MMHG | BODY MASS INDEX: 26 KG/M2 | HEART RATE: 77 BPM

## 2024-12-18 DIAGNOSIS — Z34.93 ENCOUNTER FOR SUPERVISION OF NORMAL PREGNANCY IN THIRD TRIMESTER, UNSPECIFIED GRAVIDITY (HCC): Primary | ICD-10-CM

## 2024-12-18 LAB
BILIRUBIN: NEGATIVE
GLUCOSE (URINE DIPSTICK): NEGATIVE MG/DL
KETONES (URINE DIPSTICK): NEGATIVE MG/DL
MULTISTIX LOT#: ABNORMAL NUMERIC
NITRITE, URINE: NEGATIVE
OCCULT BLOOD: NEGATIVE
PH, URINE: 7.5 (ref 4.5–8)
PROTEIN (URINE DIPSTICK): NEGATIVE MG/DL
SPECIFIC GRAVITY: 1 (ref 1–1.03)
UROBILINOGEN,SEMI-QN: 0.2 MG/DL (ref 0–1.9)

## 2024-12-18 PROCEDURE — 81002 URINALYSIS NONAUTO W/O SCOPE: CPT | Performed by: OBSTETRICS & GYNECOLOGY

## 2025-01-15 ENCOUNTER — ROUTINE PRENATAL (OUTPATIENT)
Dept: OBGYN CLINIC | Facility: CLINIC | Age: 30
End: 2025-01-15
Payer: COMMERCIAL

## 2025-01-15 VITALS
DIASTOLIC BLOOD PRESSURE: 67 MMHG | WEIGHT: 133.38 LBS | SYSTOLIC BLOOD PRESSURE: 108 MMHG | BODY MASS INDEX: 26 KG/M2 | HEART RATE: 120 BPM

## 2025-01-15 DIAGNOSIS — Z34.91 ENCOUNTER FOR SUPERVISION OF NORMAL PREGNANCY IN FIRST TRIMESTER, UNSPECIFIED GRAVIDITY (HCC): Primary | ICD-10-CM

## 2025-01-15 LAB
APPEARANCE: CLEAR
BILIRUBIN: NEGATIVE
GLUCOSE (URINE DIPSTICK): NEGATIVE MG/DL
MULTISTIX LOT#: ABNORMAL NUMERIC
NITRITE, URINE: NEGATIVE
OCCULT BLOOD: NEGATIVE
PH, URINE: 8 (ref 4.5–8)
SPECIFIC GRAVITY: 1.01 (ref 1–1.03)
URINE-COLOR: YELLOW
UROBILINOGEN,SEMI-QN: 0.2 MG/DL (ref 0–1.9)

## 2025-01-15 PROCEDURE — 81002 URINALYSIS NONAUTO W/O SCOPE: CPT | Performed by: OBSTETRICS & GYNECOLOGY

## 2025-01-22 ENCOUNTER — LAB ENCOUNTER (OUTPATIENT)
Dept: LAB | Facility: HOSPITAL | Age: 30
End: 2025-01-22
Attending: OBSTETRICS & GYNECOLOGY
Payer: COMMERCIAL

## 2025-01-22 LAB
BASOPHILS # BLD AUTO: 0.04 X10(3) UL (ref 0–0.2)
BASOPHILS NFR BLD AUTO: 0.4 %
DEPRECATED HBV CORE AB SER IA-ACNC: 6 NG/ML
DEPRECATED RDW RBC AUTO: 40.5 FL (ref 35.1–46.3)
EOSINOPHIL # BLD AUTO: 0.29 X10(3) UL (ref 0–0.7)
EOSINOPHIL NFR BLD AUTO: 2.6 %
ERYTHROCYTE [DISTWIDTH] IN BLOOD BY AUTOMATED COUNT: 12.6 % (ref 11–15)
HCT VFR BLD AUTO: 30.4 %
HGB BLD-MCNC: 9.9 G/DL
IMM GRANULOCYTES # BLD AUTO: 0.09 X10(3) UL (ref 0–1)
IMM GRANULOCYTES NFR BLD: 0.8 %
LYMPHOCYTES # BLD AUTO: 1.51 X10(3) UL (ref 1–4)
LYMPHOCYTES NFR BLD AUTO: 13.7 %
MCH RBC QN AUTO: 29 PG (ref 26–34)
MCHC RBC AUTO-ENTMCNC: 32.6 G/DL (ref 31–37)
MCV RBC AUTO: 89.1 FL
MONOCYTES # BLD AUTO: 0.87 X10(3) UL (ref 0.1–1)
MONOCYTES NFR BLD AUTO: 7.9 %
NEUTROPHILS # BLD AUTO: 8.26 X10 (3) UL (ref 1.5–7.7)
NEUTROPHILS # BLD AUTO: 8.26 X10(3) UL (ref 1.5–7.7)
NEUTROPHILS NFR BLD AUTO: 74.6 %
PLATELET # BLD AUTO: 232 10(3)UL (ref 150–450)
RBC # BLD AUTO: 3.41 X10(6)UL
T PALLIDUM AB SER QL IA: REACTIVE
WBC # BLD AUTO: 11.1 X10(3) UL (ref 4–11)

## 2025-01-22 PROCEDURE — 86592 SYPHILIS TEST NON-TREP QUAL: CPT | Performed by: OBSTETRICS & GYNECOLOGY

## 2025-01-22 PROCEDURE — 86780 TREPONEMA PALLIDUM: CPT | Performed by: OBSTETRICS & GYNECOLOGY

## 2025-01-22 PROCEDURE — 36415 COLL VENOUS BLD VENIPUNCTURE: CPT | Performed by: OBSTETRICS & GYNECOLOGY

## 2025-01-22 PROCEDURE — 87389 HIV-1 AG W/HIV-1&-2 AB AG IA: CPT | Performed by: OBSTETRICS & GYNECOLOGY

## 2025-01-22 PROCEDURE — 85025 COMPLETE CBC W/AUTO DIFF WBC: CPT | Performed by: OBSTETRICS & GYNECOLOGY

## 2025-01-22 PROCEDURE — 82728 ASSAY OF FERRITIN: CPT | Performed by: OBSTETRICS & GYNECOLOGY

## 2025-01-23 ENCOUNTER — TELEPHONE (OUTPATIENT)
Dept: OBGYN CLINIC | Facility: CLINIC | Age: 30
End: 2025-01-23

## 2025-01-23 NOTE — TELEPHONE ENCOUNTER
35w6d [facility] reaching out regarding T-pallidum screening came back positive. It has now reflexed to RPR w/reflex to titer. She was also equivocal in the first trimester with negative RPR. Spouse does have genital HSV and she does as well. She has no known hx of Syphilis. We discussed as before addtl testing is being processed, that we should see result within the week. Informed if negative no addtl testing is needed, we will let her know recommendations if +.     She is asking why the test is coming back positive if she is having negative RPR's and if she should be concerned about the baby. Informed will route to Dr. Lopez for review.     To Dr. Lopez on-call, please review and advise. Thank you.

## 2025-01-23 NOTE — TELEPHONE ENCOUNTER
With her hx she will likely always have a positive initial test which is false. The confirming test would prove it to be negative.

## 2025-01-27 ENCOUNTER — ROUTINE PRENATAL (OUTPATIENT)
Dept: OBGYN CLINIC | Facility: CLINIC | Age: 30
End: 2025-01-27
Payer: COMMERCIAL

## 2025-01-27 VITALS
HEART RATE: 94 BPM | DIASTOLIC BLOOD PRESSURE: 68 MMHG | SYSTOLIC BLOOD PRESSURE: 101 MMHG | WEIGHT: 134.63 LBS | BODY MASS INDEX: 26 KG/M2

## 2025-01-27 DIAGNOSIS — Z34.93 ENCOUNTER FOR SUPERVISION OF NORMAL PREGNANCY IN THIRD TRIMESTER, UNSPECIFIED GRAVIDITY (HCC): Primary | ICD-10-CM

## 2025-01-27 PROBLEM — O99.019 ANTEPARTUM ANEMIA (HCC): Status: ACTIVE | Noted: 2025-01-27

## 2025-01-27 LAB
BILIRUBIN: NEGATIVE
GLUCOSE (URINE DIPSTICK): NEGATIVE MG/DL
KETONES (URINE DIPSTICK): NEGATIVE MG/DL
MULTISTIX LOT#: ABNORMAL NUMERIC
NITRITE, URINE: NEGATIVE
OCCULT BLOOD: NEGATIVE
PH, URINE: 8 (ref 4.5–8)
PROTEIN (URINE DIPSTICK): NEGATIVE MG/DL
RPR SER QL: NONREACTIVE
SPECIFIC GRAVITY: 1.01 (ref 1–1.03)
UROBILINOGEN,SEMI-QN: 0.2 MG/DL (ref 0–1.9)

## 2025-01-27 PROCEDURE — 81002 URINALYSIS NONAUTO W/O SCOPE: CPT | Performed by: OBSTETRICS & GYNECOLOGY

## 2025-01-27 RX ORDER — VALACYCLOVIR HYDROCHLORIDE 500 MG/1
500 TABLET, FILM COATED ORAL 2 TIMES DAILY
Qty: 60 TABLET | Refills: 0 | Status: SHIPPED | OUTPATIENT
Start: 2025-01-27

## 2025-01-27 NOTE — PROGRESS NOTES
Doing well. She states that she tried to schedule with MFM, but was told that she couldn't; order resent and instructed to call today. Vaktrex Rx sent. Start Monistat 7 for suspected yeast infection. GBS collected. RTC 1 wk

## 2025-01-28 LAB — GROUP B STREP BY PCR FOR PCR OVT: NEGATIVE

## 2025-01-29 ENCOUNTER — HOSPITAL ENCOUNTER (OUTPATIENT)
Facility: HOSPITAL | Age: 30
Discharge: HOME | End: 2025-01-29
Attending: OBSTETRICS & GYNECOLOGY | Admitting: OBSTETRICS & GYNECOLOGY
Payer: COMMERCIAL

## 2025-01-29 VITALS
SYSTOLIC BLOOD PRESSURE: 109 MMHG | HEART RATE: 111 BPM | TEMPERATURE: 98 F | DIASTOLIC BLOOD PRESSURE: 73 MMHG | RESPIRATION RATE: 17 BRPM

## 2025-01-29 LAB
TREPONEMAL ANTIBODIES, TPPA: NON REACTIVE
TREPONEMAL ANTIBODIES, TPPA: NON REACTIVE

## 2025-01-29 PROCEDURE — 59025 FETAL NON-STRESS TEST: CPT

## 2025-01-29 PROCEDURE — 59025 FETAL NON-STRESS TEST: CPT | Performed by: OBSTETRICS & GYNECOLOGY

## 2025-01-30 NOTE — NST
Nonstress Test   Patient: Valeria Hayes    Gestation: 36w5d    NST:       Variability: Moderate           Accelerations: Yes           Decelerations: None            Baseline: 140 BPM           Uterine Irritability: No                                                    Acoustic Stimulator: No           Nonstress Test Interpretation: Reactive           Nonstress Test Second Interpretation: Reactive          FHR Category: Category I          Additional Comments Comments: Pt is a  at 36.5 here for scheduled NST due to Marijuana use during pregnancy. NST reactive, no complaints voiced. Dr. Angel reported findings. Pt discharged home. Provided with discharge instructions and labor precautions. Pt verbalizes understanding.

## 2025-01-30 NOTE — PROGRESS NOTES
Pt is a 30 year old female admitted to TR3/TR3-A.     Chief Complaint   Patient presents with    Non-stress Test     PT here for scheduled NS due to Marijuana use in pregnancyT. Pt states she is feeling baby moving well. Pt denies having vaginal bleeding, leaking of fluid or contraction pain.      Pt is  36w5d intra-uterine pregnancy.  History obtained, consents signed. Oriented to room, staff, and plan of care.

## 2025-01-31 ENCOUNTER — TELEPHONE (OUTPATIENT)
Dept: OBGYN CLINIC | Facility: CLINIC | Age: 30
End: 2025-01-31

## 2025-01-31 NOTE — TELEPHONE ENCOUNTER
Katie from [facility] following up on T Pall results from 1/22/25.   They faxed request for lab results to us due to reactive T Pall in pregnancy.   Results sent with negative reflex testing. Faxed to [phone]

## 2025-02-04 ENCOUNTER — HOSPITAL ENCOUNTER (OUTPATIENT)
Facility: HOSPITAL | Age: 30
Discharge: HOME | End: 2025-02-04
Attending: OBSTETRICS & GYNECOLOGY | Admitting: OBSTETRICS & GYNECOLOGY
Payer: COMMERCIAL

## 2025-02-04 VITALS
TEMPERATURE: 98 F | DIASTOLIC BLOOD PRESSURE: 64 MMHG | RESPIRATION RATE: 16 BRPM | SYSTOLIC BLOOD PRESSURE: 105 MMHG | HEART RATE: 94 BPM

## 2025-02-04 DIAGNOSIS — Z34.90 PREGNANCY (HCC): ICD-10-CM

## 2025-02-04 PROCEDURE — 59025 FETAL NON-STRESS TEST: CPT

## 2025-02-04 PROCEDURE — 59025 FETAL NON-STRESS TEST: CPT | Performed by: OBSTETRICS & GYNECOLOGY

## 2025-02-04 RX ORDER — OMEPRAZOLE 20 MG/1
20 CAPSULE, DELAYED RELEASE ORAL
COMMUNITY

## 2025-02-04 NOTE — PROGRESS NOTES
Pt is a 30 year old female admitted to TR2/TR2-A.     Chief Complaint   Patient presents with    Non-stress Test     Cannabis use in pregnancy      Pt is  37w4d intra-uterine pregnancy.  History obtained, consents signed. Oriented to room, staff, and plan of care.

## 2025-02-06 ENCOUNTER — NST DOCUMENTATION (OUTPATIENT)
Dept: OBGYN CLINIC | Facility: CLINIC | Age: 30
End: 2025-02-06

## 2025-02-06 ENCOUNTER — ROUTINE PRENATAL (OUTPATIENT)
Dept: OBGYN CLINIC | Facility: CLINIC | Age: 30
End: 2025-02-06
Payer: COMMERCIAL

## 2025-02-06 VITALS
WEIGHT: 134.69 LBS | BODY MASS INDEX: 26 KG/M2 | SYSTOLIC BLOOD PRESSURE: 102 MMHG | HEART RATE: 76 BPM | DIASTOLIC BLOOD PRESSURE: 67 MMHG

## 2025-02-06 DIAGNOSIS — Z34.91 ENCOUNTER FOR SUPERVISION OF NORMAL PREGNANCY IN FIRST TRIMESTER, UNSPECIFIED GRAVIDITY (HCC): Primary | ICD-10-CM

## 2025-02-06 LAB
APPEARANCE: CLEAR
BILIRUBIN: NEGATIVE
GLUCOSE (URINE DIPSTICK): NEGATIVE MG/DL
KETONES (URINE DIPSTICK): NEGATIVE MG/DL
LEUKOCYTES: NEGATIVE
MULTISTIX LOT#: NORMAL NUMERIC
NITRITE, URINE: NEGATIVE
OCCULT BLOOD: NEGATIVE
PH, URINE: 7 (ref 4.5–8)
PROTEIN (URINE DIPSTICK): NEGATIVE MG/DL
SPECIFIC GRAVITY: 1.01 (ref 1–1.03)
URINE-COLOR: YELLOW
UROBILINOGEN,SEMI-QN: 0.2 MG/DL (ref 0–1.9)

## 2025-02-06 PROCEDURE — 81002 URINALYSIS NONAUTO W/O SCOPE: CPT | Performed by: OBSTETRICS & GYNECOLOGY

## 2025-02-06 NOTE — NST
Nonstress Test   Patient: Valeria Hayes    Gestation: 36+    Diagnosis from order:      Problem List:   Patient Active Problem List   Diagnosis    Genital herpes affecting pregnancy in first trimester ([facility])    Marijuana use during pregnancy ([facility])    Anxiety and depression    Genetic testing    GERD (gastroesophageal reflux disease)    14 weeks gestation of pregnancy ([facility])    Antepartum anemia ([facility])       NST:        2025   NST DOCUMENTATION   Variability 6-25 BPM   Accelerations Yes   Decelerations None   Baseline 135 BPM   Uterine Irritability No   Contractions Not present   Acoustic Stimulator No   Nonstress Test Interpretation Reactive   Nonstress Test Second Interpretation Reactive   FHR Category Category I   Comments , 39 , nst for cannabis use in pregnancy, nst reactive, dr. angel notified and patient discharged home, weekly nsts.   NST Completed by    Disposition home    Testing Plan Weekly NST   Provider Notified natasha espinoza         I agree with the above evaluation. NST completed.  Marci Angel MD  2025  12:07 PM

## 2025-02-06 NOTE — NST
Nonstress Test   Patient: Valeria Hayes    Gestation: 37w6d    Diagnosis from order:      Problem List:   Patient Active Problem List   Diagnosis    Genital herpes affecting pregnancy in first trimester ([facility])    Marijuana use during pregnancy ([facility])    Anxiety and depression    Genetic testing    GERD (gastroesophageal reflux disease)    14 weeks gestation of pregnancy ([facility])    Antepartum anemia ([facility])       NST:        2025   NST DOCUMENTATION   Variability 6-25 BPM   Accelerations Yes   Decelerations None   Baseline 135 BPM   Uterine Irritability No   Contractions Not present   Acoustic Stimulator No   Nonstress Test Interpretation Reactive   Nonstress Test Second Interpretation Reactive   FHR Category Category I   Comments , 39 , nst for cannabis use in pregnancy, nst reactive, dr. angel notified and patient discharged home, weekly nsts.   NST Completed by sandip salmon rn   Disposition home    Testing Plan Weekly NST   Provider Notified natasha espinoza         I agree with the above evaluation. NST completed.  Marci Angel MD  2025  12:11 PM

## 2025-02-06 NOTE — PROGRESS NOTES
Rodriguez at visit. Group beta strep negative. Vertex at -1 with cervix posterior. Has instructions.

## 2025-02-11 ENCOUNTER — HOSPITAL ENCOUNTER (OUTPATIENT)
Facility: HOSPITAL | Age: 30
Discharge: HOME | End: 2025-02-11
Attending: OBSTETRICS & GYNECOLOGY | Admitting: OBSTETRICS & GYNECOLOGY
Payer: COMMERCIAL

## 2025-02-11 VITALS
SYSTOLIC BLOOD PRESSURE: 105 MMHG | HEART RATE: 104 BPM | TEMPERATURE: 98 F | RESPIRATION RATE: 17 BRPM | DIASTOLIC BLOOD PRESSURE: 65 MMHG

## 2025-02-11 PROCEDURE — 59025 FETAL NON-STRESS TEST: CPT | Performed by: OBSTETRICS & GYNECOLOGY

## 2025-02-11 PROCEDURE — 59025 FETAL NON-STRESS TEST: CPT

## 2025-02-12 ENCOUNTER — ROUTINE PRENATAL (OUTPATIENT)
Dept: OBGYN CLINIC | Facility: CLINIC | Age: 30
End: 2025-02-12
Payer: COMMERCIAL

## 2025-02-12 VITALS
SYSTOLIC BLOOD PRESSURE: 97 MMHG | BODY MASS INDEX: 27 KG/M2 | WEIGHT: 137.38 LBS | DIASTOLIC BLOOD PRESSURE: 58 MMHG | HEART RATE: 99 BPM

## 2025-02-12 DIAGNOSIS — Z34.93 ENCOUNTER FOR SUPERVISION OF NORMAL PREGNANCY IN THIRD TRIMESTER, UNSPECIFIED GRAVIDITY (HCC): Primary | ICD-10-CM

## 2025-02-12 LAB
APPEARANCE: CLEAR
GLUCOSE (URINE DIPSTICK): NEGATIVE MG/DL
LEUKOCYTES: NEGATIVE
MULTISTIX LOT#: ABNORMAL NUMERIC
NITRITE, URINE: NEGATIVE
OCCULT BLOOD: NEGATIVE
PH, URINE: 5 (ref 4.5–8)
PROTEIN (URINE DIPSTICK): NEGATIVE MG/DL
SPECIFIC GRAVITY: 1.02 (ref 1–1.03)
URINE-COLOR: YELLOW
UROBILINOGEN,SEMI-QN: 0.2 MG/DL (ref 0–1.9)

## 2025-02-12 PROCEDURE — 81002 URINALYSIS NONAUTO W/O SCOPE: CPT | Performed by: OBSTETRICS & GYNECOLOGY

## 2025-02-12 NOTE — PROGRESS NOTES
Pt is a 30 year old female admitted to TR2/TR2-A.     Chief Complaint   Patient presents with    Non-stress Test     Pt here for scheduled NST due to Marijuana use during pregnancy. Pt states she is feeling baby moving. Denies having regular contraction pain, leaking of fluid or vaginal bleeding.      Pt is  38w4d intra-uterine pregnancy.  History obtained, consents signed. Oriented to room, staff, and plan of care.

## 2025-02-14 ENCOUNTER — HOSPITAL ENCOUNTER (OUTPATIENT)
Dept: PERINATAL CARE | Facility: HOSPITAL | Age: 30
Discharge: HOME | End: 2025-02-14
Attending: OBSTETRICS & GYNECOLOGY
Payer: COMMERCIAL

## 2025-02-14 DIAGNOSIS — O99.320 MARIJUANA USE DURING PREGNANCY (HCC): ICD-10-CM

## 2025-02-14 DIAGNOSIS — F12.90 MARIJUANA USE DURING PREGNANCY (HCC): ICD-10-CM

## 2025-02-14 PROCEDURE — 76816 OB US FOLLOW-UP PER FETUS: CPT | Performed by: OBSTETRICS & GYNECOLOGY

## 2025-02-18 ENCOUNTER — HOSPITAL ENCOUNTER (OUTPATIENT)
Facility: HOSPITAL | Age: 30
Discharge: HOME | End: 2025-02-18
Attending: OBSTETRICS & GYNECOLOGY | Admitting: OBSTETRICS & GYNECOLOGY
Payer: COMMERCIAL

## 2025-02-18 ENCOUNTER — NST DOCUMENTATION (OUTPATIENT)
Dept: OBGYN CLINIC | Facility: CLINIC | Age: 30
End: 2025-02-18

## 2025-02-18 VITALS — HEART RATE: 98 BPM | DIASTOLIC BLOOD PRESSURE: 78 MMHG | SYSTOLIC BLOOD PRESSURE: 117 MMHG

## 2025-02-18 DIAGNOSIS — Z34.90 PREGNANCY (HCC): ICD-10-CM

## 2025-02-18 PROCEDURE — 59025 FETAL NON-STRESS TEST: CPT

## 2025-02-18 PROCEDURE — 59025 FETAL NON-STRESS TEST: CPT | Performed by: OBSTETRICS & GYNECOLOGY

## 2025-02-19 ENCOUNTER — HOSPITAL ENCOUNTER (OUTPATIENT)
Facility: HOSPITAL | Age: 30
Discharge: HOME | End: 2025-02-19
Attending: OBSTETRICS & GYNECOLOGY | Admitting: OBSTETRICS & GYNECOLOGY
Payer: COMMERCIAL

## 2025-02-19 VITALS
RESPIRATION RATE: 18 BRPM | DIASTOLIC BLOOD PRESSURE: 73 MMHG | TEMPERATURE: 98 F | HEART RATE: 84 BPM | SYSTOLIC BLOOD PRESSURE: 113 MMHG

## 2025-02-19 VITALS — DIASTOLIC BLOOD PRESSURE: 70 MMHG | SYSTOLIC BLOOD PRESSURE: 116 MMHG | HEART RATE: 89 BPM

## 2025-02-19 LAB
ALBUMIN SERPL-MCNC: 4.2 G/DL (ref 3.2–4.8)
ALBUMIN/GLOB SERPL: 1.3 {RATIO} (ref 1–2)
ALP LIVER SERPL-CCNC: 148 U/L
ALT SERPL-CCNC: 14 U/L
ANION GAP SERPL CALC-SCNC: 13 MMOL/L (ref 0–18)
AST SERPL-CCNC: 28 U/L (ref ?–34)
BASOPHILS # BLD AUTO: 0.02 X10(3) UL (ref 0–0.2)
BASOPHILS NFR BLD AUTO: 0.1 %
BILIRUB SERPL-MCNC: 1.1 MG/DL (ref 0.3–1.2)
BUN BLD-MCNC: 10 MG/DL (ref 9–23)
BUN/CREAT SERPL: 17.5 (ref 10–20)
CALCIUM BLD-MCNC: 9.1 MG/DL (ref 8.7–10.4)
CHLORIDE SERPL-SCNC: 99 MMOL/L (ref 98–112)
CO2 SERPL-SCNC: 23 MMOL/L (ref 21–32)
CREAT BLD-MCNC: 0.57 MG/DL
CREAT UR-SCNC: 161.8 MG/DL
DEPRECATED RDW RBC AUTO: 42.1 FL (ref 35.1–46.3)
EGFRCR SERPLBLD CKD-EPI 2021: 127 ML/MIN/1.73M2 (ref 60–?)
EOSINOPHIL # BLD AUTO: 0.09 X10(3) UL (ref 0–0.7)
EOSINOPHIL NFR BLD AUTO: 0.6 %
ERYTHROCYTE [DISTWIDTH] IN BLOOD BY AUTOMATED COUNT: 13.3 % (ref 11–15)
FASTING STATUS PATIENT QL REPORTED: YES
GLOBULIN PLAS-MCNC: 3.2 G/DL (ref 2–3.5)
GLUCOSE BLD-MCNC: 78 MG/DL (ref 70–99)
HCT VFR BLD AUTO: 31.8 %
HGB BLD-MCNC: 10.8 G/DL
IMM GRANULOCYTES # BLD AUTO: 0.09 X10(3) UL (ref 0–1)
IMM GRANULOCYTES NFR BLD: 0.6 %
LYMPHOCYTES # BLD AUTO: 0.95 X10(3) UL (ref 1–4)
LYMPHOCYTES NFR BLD AUTO: 6.8 %
MCH RBC QN AUTO: 29.3 PG (ref 26–34)
MCHC RBC AUTO-ENTMCNC: 34 G/DL (ref 31–37)
MCV RBC AUTO: 86.4 FL
MONOCYTES # BLD AUTO: 0.38 X10(3) UL (ref 0.1–1)
MONOCYTES NFR BLD AUTO: 2.7 %
NEUTROPHILS # BLD AUTO: 12.47 X10 (3) UL (ref 1.5–7.7)
NEUTROPHILS # BLD AUTO: 12.47 X10(3) UL (ref 1.5–7.7)
NEUTROPHILS NFR BLD AUTO: 89.2 %
OSMOLALITY SERPL CALC.SUM OF ELEC: 278 MOSM/KG (ref 275–295)
PLATELET # BLD AUTO: 245 10(3)UL (ref 150–450)
POTASSIUM SERPL-SCNC: 4.3 MMOL/L (ref 3.5–5.1)
PROT SERPL-MCNC: 7.4 G/DL (ref 5.7–8.2)
PROT UR-MCNC: 33.5 MG/DL (ref ?–14)
PROT/CREAT UR-RTO: 0.21
RBC # BLD AUTO: 3.68 X10(6)UL
SODIUM SERPL-SCNC: 135 MMOL/L (ref 136–145)
WBC # BLD AUTO: 14 X10(3) UL (ref 4–11)

## 2025-02-19 PROCEDURE — 99213 OFFICE O/P EST LOW 20 MIN: CPT

## 2025-02-19 PROCEDURE — 80053 COMPREHEN METABOLIC PANEL: CPT | Performed by: OBSTETRICS & GYNECOLOGY

## 2025-02-19 PROCEDURE — 96372 THER/PROPH/DIAG INJ SC/IM: CPT

## 2025-02-19 PROCEDURE — 82570 ASSAY OF URINE CREATININE: CPT | Performed by: OBSTETRICS & GYNECOLOGY

## 2025-02-19 PROCEDURE — 84156 ASSAY OF PROTEIN URINE: CPT | Performed by: OBSTETRICS & GYNECOLOGY

## 2025-02-19 PROCEDURE — 59025 FETAL NON-STRESS TEST: CPT | Performed by: OBSTETRICS & GYNECOLOGY

## 2025-02-19 PROCEDURE — 36415 COLL VENOUS BLD VENIPUNCTURE: CPT

## 2025-02-19 PROCEDURE — 85025 COMPLETE CBC W/AUTO DIFF WBC: CPT | Performed by: OBSTETRICS & GYNECOLOGY

## 2025-02-19 PROCEDURE — 59025 FETAL NON-STRESS TEST: CPT

## 2025-02-19 RX ORDER — NALBUPHINE HYDROCHLORIDE 10 MG/ML
10 INJECTION INTRAMUSCULAR; INTRAVENOUS; SUBCUTANEOUS ONCE
Status: COMPLETED | OUTPATIENT
Start: 2025-02-19 | End: 2025-02-19

## 2025-02-19 RX ORDER — HYDROXYZINE HYDROCHLORIDE 50 MG/ML
50 INJECTION, SOLUTION INTRAMUSCULAR ONCE
Status: COMPLETED | OUTPATIENT
Start: 2025-02-19 | End: 2025-02-19

## 2025-02-19 RX ORDER — ZOLPIDEM TARTRATE 5 MG/1
5 TABLET ORAL ONCE
Status: DISCONTINUED | OUTPATIENT
Start: 2025-02-19 | End: 2025-02-19

## 2025-02-19 NOTE — PROGRESS NOTES
Pt is a 30 year old female admitted to TR3/TR3-A.     Chief Complaint   Patient presents with    R/o Labor     Painful CTXs since midnight now more frequent and intense. +FM noted, denies LOF or VB      Pt is  39w5d intra-uterine pregnancy.  History obtained, consents signed. Oriented to room, staff, and plan of care.

## 2025-02-19 NOTE — NST
Nonstress Test   Patient: Valeria Hayes    Gestation: 39w4d    Diagnosis from order:  Marijuana use in pregnancy    Problem List:   Patient Active Problem List   Diagnosis    Genital herpes affecting pregnancy in first trimester ([facility])    Marijuana use during pregnancy ([facility])    Anxiety and depression    Genetic testing    GERD (gastroesophageal reflux disease)    14 weeks gestation of pregnancy ([facility])    Antepartum anemia ([facility])       NST:        2025   NST DOCUMENTATION   Variability 6-25 BPM   Accelerations Yes   Decelerations None   Baseline 125 BPM   Uterine Irritability No   Contractions Irregular   Acoustic Stimulator No   Nonstress Test Interpretation Reactive   NST Completed by BEN Evans   Disposition Home    Testing Plan Weekly NST   Provider Notified MD Chai         I agree with the above evaluation. NST completed.  [place] MD  2025  11:33 PM

## 2025-02-19 NOTE — TRIAGE
[facility]  part of [facility]      Triage Note    Valeria Hayes Patient Status:  Outpatient    1995 MRN [id number]   Location [facility] Attending [place] MD   Hosp Day # 0 PCP None Pcp      Para:   Estimated Date of Delivery: 25  Gestation: 39w5d    Chief Complaint    R/o Labor         Allergies:  Allergies[1]    No orders of the defined types were placed in this encounter.      Lab Results   Component Value Date    WBC 11.1 (H) 2025    HGB 9.9 (L) 2025    HCT 30.4 (L) 2025    .0 2025    CREATSERUM 0.51 (L) 2024    BUN 6 (L) 2024     2024    K 3.1 (L) 2024     2024    CO2 21.0 2024    GLU 94 2024    CA 9.5 2024    ALB 4.3 2024    ALKPHO 47 2024    BILT 1.5 (H) 2024    TP 7.6 2024    AST 22 2024    ALT 20 2024    LIP 45 2024    MG 1.9 2024    RPR Nonreactive 2025       Clinitek UA  Lab Results   Component Value Date    GLUUR 100 (A) 2024    SPECGRAVITY 1.025 2025       UA  Lab Results   Component Value Date    COLORUR Yellow 2024    CLARITY Ex.Turbid (A) 2024    SPECGRAVITY 1.025 2025    PROUR 20 (A) 2024    GLUUR 100 (A) 2024    KETUR >150 (A) 2024    BILUR Negative 2024    BLOODURINE Negative 2024    NITRITE Negative 2025    UROBILINOGEN Normal 2024    LEUUR Negative 2024       Vitals:    25 0515   BP: 113/73   BP Location: Left arm   Pulse: 84   Resp: 18   Temp: 98.2 °F (36.8 °C)   TempSrc: Oral       NST  Variability: Moderate           Accelerations: Yes           Decelerations: None            Baseline: 125 BPM           Uterine Irritability: No           Contractions: Irregular                                        Acoustic Stimulator: No           Nonstress Test Interpretation: Reactive            Nonstress Test Second Interpretation: Reactive          FHR Category: Category I             Additional Comments       Chief Complaint   Patient presents with    R/o Labor     Painful CTXs since midnight now more frequent and intense. +FM noted, denies LOF or VB   Pt was was examined by BOB Quan RN and was 1.5/70/-1, jarrett irregularly. Pt was reexamined 2 hours later and unchanged. NST reactive. Findings reported to Dr Diego per telephone. Pt dc home with labor precautions and kick counts. Pt and SO with verbalized understanding.       Rose ROJAS RN  2/19/2025 8:18 AM         [1] No Known Allergies

## 2025-02-20 ENCOUNTER — HOSPITAL ENCOUNTER (INPATIENT)
Facility: HOSPITAL | Age: 30
LOS: 3 days | Discharge: HOME | End: 2025-02-23
Attending: OBSTETRICS & GYNECOLOGY | Admitting: OBSTETRICS & GYNECOLOGY
Payer: COMMERCIAL

## 2025-02-20 ENCOUNTER — ANESTHESIA (OUTPATIENT)
Dept: OBGYN UNIT | Facility: HOSPITAL | Age: 30
End: 2025-02-20
Payer: COMMERCIAL

## 2025-02-20 ENCOUNTER — TELEPHONE (OUTPATIENT)
Dept: OBGYN CLINIC | Facility: CLINIC | Age: 30
End: 2025-02-20

## 2025-02-20 ENCOUNTER — ANESTHESIA EVENT (OUTPATIENT)
Dept: OBGYN UNIT | Facility: HOSPITAL | Age: 30
End: 2025-02-20
Payer: COMMERCIAL

## 2025-02-20 PROBLEM — Z37.9 NORMAL LABOR (HCC): Status: ACTIVE | Noted: 2025-02-20

## 2025-02-20 LAB
ALBUMIN SERPL-MCNC: 4.1 G/DL (ref 3.2–4.8)
ALBUMIN/GLOB SERPL: 1.3 {RATIO} (ref 1–2)
ALP LIVER SERPL-CCNC: 149 U/L
ALT SERPL-CCNC: 12 U/L
ANION GAP SERPL CALC-SCNC: 11 MMOL/L (ref 0–18)
ANTIBODY SCREEN: NEGATIVE
AST SERPL-CCNC: 23 U/L (ref ?–34)
BILIRUB SERPL-MCNC: 1.2 MG/DL (ref 0.3–1.2)
BUN BLD-MCNC: 9 MG/DL (ref 9–23)
BUN/CREAT SERPL: 15.5 (ref 10–20)
CALCIUM BLD-MCNC: 8.9 MG/DL (ref 8.7–10.4)
CHLORIDE SERPL-SCNC: 101 MMOL/L (ref 98–112)
CO2 SERPL-SCNC: 22 MMOL/L (ref 21–32)
CREAT BLD-MCNC: 0.58 MG/DL
CREAT UR-SCNC: 89.2 MG/DL
EGFRCR SERPLBLD CKD-EPI 2021: 126 ML/MIN/1.73M2 (ref 60–?)
GLOBULIN PLAS-MCNC: 3.2 G/DL (ref 2–3.5)
GLUCOSE BLD-MCNC: 89 MG/DL (ref 70–99)
OSMOLALITY SERPL CALC.SUM OF ELEC: 276 MOSM/KG (ref 275–295)
POTASSIUM SERPL-SCNC: 4 MMOL/L (ref 3.5–5.1)
PROT SERPL-MCNC: 7.3 G/DL (ref 5.7–8.2)
PROT UR-MCNC: 7.9 MG/DL (ref ?–14)
PROT/CREAT UR-RTO: 0.09
RH BLOOD TYPE: POSITIVE
SODIUM SERPL-SCNC: 134 MMOL/L (ref 136–145)
T PALLIDUM AB SER QL IA: REACTIVE

## 2025-02-20 PROCEDURE — 10H07YZ INSERTION OF OTHER DEVICE INTO PRODUCTS OF CONCEPTION, VIA NATURAL OR ARTIFICIAL OPENING: ICD-10-PCS | Performed by: OBSTETRICS & GYNECOLOGY

## 2025-02-20 RX ORDER — ACETAMINOPHEN 500 MG
1000 TABLET ORAL EVERY 6 HOURS PRN
Status: DISCONTINUED | OUTPATIENT
Start: 2025-02-20 | End: 2025-02-21 | Stop reason: HOSPADM

## 2025-02-20 RX ORDER — BUPIVACAINE HYDROCHLORIDE 2.5 MG/ML
20 INJECTION, SOLUTION EPIDURAL; INFILTRATION; INTRACAUDAL ONCE
Status: COMPLETED | OUTPATIENT
Start: 2025-02-20 | End: 2025-02-20

## 2025-02-20 RX ORDER — BUPIVACAINE HCL/0.9 % NACL/PF 0.25 %
5 PLASTIC BAG, INJECTION (ML) EPIDURAL AS NEEDED
Status: DISCONTINUED | OUTPATIENT
Start: 2025-02-20 | End: 2025-02-20

## 2025-02-20 RX ORDER — ACETAMINOPHEN 500 MG
500 TABLET ORAL EVERY 6 HOURS PRN
Status: DISCONTINUED | OUTPATIENT
Start: 2025-02-20 | End: 2025-02-21 | Stop reason: HOSPADM

## 2025-02-20 RX ORDER — LIDOCAINE HYDROCHLORIDE AND EPINEPHRINE 15; 5 MG/ML; UG/ML
INJECTION, SOLUTION EPIDURAL AS NEEDED
Status: DISCONTINUED | OUTPATIENT
Start: 2025-02-20 | End: 2025-02-21 | Stop reason: SURG

## 2025-02-20 RX ORDER — CITRIC ACID/SODIUM CITRATE 334-500MG
30 SOLUTION, ORAL ORAL AS NEEDED
Status: COMPLETED | OUTPATIENT
Start: 2025-02-20 | End: 2025-02-21

## 2025-02-20 RX ORDER — LIDOCAINE HYDROCHLORIDE 10 MG/ML
30 INJECTION, SOLUTION EPIDURAL; INFILTRATION; INTRACAUDAL; PERINEURAL ONCE
Status: DISCONTINUED | OUTPATIENT
Start: 2025-02-20 | End: 2025-02-21 | Stop reason: HOSPADM

## 2025-02-20 RX ORDER — TERBUTALINE SULFATE 1 MG/ML
0.25 INJECTION SUBCUTANEOUS AS NEEDED
Status: DISCONTINUED | OUTPATIENT
Start: 2025-02-20 | End: 2025-02-21 | Stop reason: HOSPADM

## 2025-02-20 RX ORDER — NALBUPHINE HYDROCHLORIDE 10 MG/ML
2.5 INJECTION INTRAMUSCULAR; INTRAVENOUS; SUBCUTANEOUS
Status: DISCONTINUED | OUTPATIENT
Start: 2025-02-20 | End: 2025-02-20

## 2025-02-20 RX ORDER — SODIUM CHLORIDE, SODIUM LACTATE, POTASSIUM CHLORIDE, CALCIUM CHLORIDE 600; 310; 30; 20 MG/100ML; MG/100ML; MG/100ML; MG/100ML
INJECTION, SOLUTION INTRAVENOUS AS NEEDED
Status: DISCONTINUED | OUTPATIENT
Start: 2025-02-20 | End: 2025-02-21 | Stop reason: HOSPADM

## 2025-02-20 RX ORDER — CALCIUM CARBONATE 500 MG/1
1000 TABLET, CHEWABLE ORAL EVERY 4 HOURS PRN
Status: DISCONTINUED | OUTPATIENT
Start: 2025-02-20 | End: 2025-02-21 | Stop reason: HOSPADM

## 2025-02-20 RX ORDER — LIDOCAINE HYDROCHLORIDE 10 MG/ML
INJECTION, SOLUTION EPIDURAL; INFILTRATION; INTRACAUDAL; PERINEURAL AS NEEDED
Status: DISCONTINUED | OUTPATIENT
Start: 2025-02-20 | End: 2025-02-21 | Stop reason: SURG

## 2025-02-20 RX ORDER — BUPIVACAINE HCL/0.9 % NACL/PF 0.25 %
5 PLASTIC BAG, INJECTION (ML) EPIDURAL AS NEEDED
Status: DISCONTINUED | OUTPATIENT
Start: 2025-02-20 | End: 2025-02-21

## 2025-02-20 RX ORDER — NALBUPHINE HYDROCHLORIDE 10 MG/ML
2.5 INJECTION INTRAMUSCULAR; INTRAVENOUS; SUBCUTANEOUS
Status: DISCONTINUED | OUTPATIENT
Start: 2025-02-20 | End: 2025-02-21

## 2025-02-20 RX ORDER — ONDANSETRON 2 MG/ML
4 INJECTION INTRAMUSCULAR; INTRAVENOUS EVERY 6 HOURS PRN
Status: DISCONTINUED | OUTPATIENT
Start: 2025-02-20 | End: 2025-02-21 | Stop reason: HOSPADM

## 2025-02-20 RX ORDER — DIPHENHYDRAMINE HYDROCHLORIDE 50 MG/ML
25 INJECTION INTRAMUSCULAR; INTRAVENOUS ONCE
Status: COMPLETED | OUTPATIENT
Start: 2025-02-20 | End: 2025-02-20

## 2025-02-20 RX ORDER — BUPIVACAINE HYDROCHLORIDE 2.5 MG/ML
20 INJECTION, SOLUTION EPIDURAL; INFILTRATION; INTRACAUDAL ONCE
Status: DISCONTINUED | OUTPATIENT
Start: 2025-02-20 | End: 2025-02-20

## 2025-02-20 RX ORDER — DEXTROSE, SODIUM CHLORIDE, SODIUM LACTATE, POTASSIUM CHLORIDE, AND CALCIUM CHLORIDE 5; .6; .31; .03; .02 G/100ML; G/100ML; G/100ML; G/100ML; G/100ML
INJECTION, SOLUTION INTRAVENOUS CONTINUOUS
Status: DISCONTINUED | OUTPATIENT
Start: 2025-02-20 | End: 2025-02-21 | Stop reason: HOSPADM

## 2025-02-20 RX ORDER — IBUPROFEN 600 MG/1
600 TABLET, FILM COATED ORAL ONCE AS NEEDED
Status: DISCONTINUED | OUTPATIENT
Start: 2025-02-20 | End: 2025-02-21 | Stop reason: HOSPADM

## 2025-02-20 RX ADMIN — LIDOCAINE HYDROCHLORIDE AND EPINEPHRINE 3 ML: 15; 5 INJECTION, SOLUTION EPIDURAL at 16:00:00

## 2025-02-20 RX ADMIN — LIDOCAINE HYDROCHLORIDE 5 ML: 10 INJECTION, SOLUTION EPIDURAL; INFILTRATION; INTRACAUDAL; PERINEURAL at 15:50:00

## 2025-02-20 NOTE — ANESTHESIA PREPROCEDURE EVALUATION
Anesthesia PreOp Note    HPI:     Valeria Hayes is a 30 year old female who presents for preoperative consultation requested by: * No surgeons listed *    Date of Surgery: 2025    * No procedures listed *  Indication: * No pre-op diagnosis entered *    Relevant Problems   No relevant active problems       NPO:                         History Review:  Patient Active Problem List    Diagnosis Date Noted    Normal labor ([facility]) 2025    Antepartum anemia ([facility]) 2025    Genital herpes affecting pregnancy in first trimester ([facility]) 2024    Marijuana use during pregnancy ([facility]) 2024    Anxiety and depression 2024    Genetic testing 2024    GERD (gastroesophageal reflux disease) 2024    14 weeks gestation of pregnancy ([facility]) 2024       Past Medical History:    Anemia    Anxiety    Asthma ([facility])    Decorative tattoo    Depression    Genital herpes simplex       No past surgical history on file.    Prescriptions Prior to Admission[1]  Current Medications and Prescriptions Ordered in Epic[2]    Allergies[3]    Family History   Problem Relation Age of Onset    Diabetes Father     Diabetes Maternal Grandmother     Cancer Maternal Grandmother     Cancer Paternal Grandfather      Social History     Socioeconomic History    Marital status: Single   Tobacco Use    Smoking status: Former     Current packs/day: 0.00     Types: Cigarettes     Quit date:      Years since quittin.1     Passive exposure: Never    Smokeless tobacco: Former     Quit date:    Vaping Use    Vaping status: Never Used   Substance and Sexual Activity    Alcohol use: Not Currently     Comment: Social prior to pregnancy    Drug use: Yes     Types: Cannabis       Available pre-op labs reviewed.  Lab Results   Component Value Date    WBC 14.0 (H) 2025    RBC 3.68 (L) 2025    HGB 10.8 (L) 2025    HCT 31.8 (L) 2025    MCV 86.4 2025    MCH 29.3 2025    MCHC 34.0  02/19/2025    RDW 13.3 02/19/2025    .0 02/19/2025     Lab Results   Component Value Date     (L) 02/20/2025    K 4.0 02/20/2025     02/20/2025    CO2 22.0 02/20/2025    BUN 9 02/20/2025    CREATSERUM 0.58 02/20/2025    GLU 89 02/20/2025    CA 8.9 02/20/2025          Vital Signs:  Body mass index is 26.76 kg/m².   weight is 62.1 kg (137 lb). Her oral temperature is 98 °F (36.7 °C). Her blood pressure is 132/85 and her pulse is 90.   Vitals:    02/20/25 1400 02/20/25 1407 02/20/25 1415 02/20/25 1515   BP: 136/90 (!) 137/91 132/85    Pulse: 109 104 90    Temp:  98 °F (36.7 °C)     TempSrc:  Oral     Weight:    62.1 kg (137 lb)        Anesthesia Evaluation      Airway   Mallampati: I  TM distance: >3 FB  Neck ROM: full  Dental      Pulmonary - normal exam   (+) asthma  Cardiovascular - normal exam    Neuro/Psych    (+)  anxiety/panic attacks,        GI/Hepatic/Renal    (+) GERD    Endo/Other    Abdominal  - normal exam                 Anesthesia Plan:   ASA:  2  Emergent    Plan:   Epidural  Informed Consent Plan and Risks Discussed With:  Patient      I have informed Valeria Hayes and/or legal guardian or family member of the nature of the anesthetic plan, benefits, risks including possible dental damage if relevant, major complications, and any alternative forms of anesthetic management.   All of the patient's questions were answered to the best of my ability. The patient desires the anesthetic management as planned.  ESTER NARAYANAN MD  2/20/2025 3:44 PM  Present on Admission:  **None**           [1]   Medications Prior to Admission   Medication Sig Dispense Refill Last Dose/Taking    omeprazole 20 MG Oral Capsule Delayed Release Take 1 capsule (20 mg total) by mouth every morning before breakfast.   Taking    valACYclovir (VALTREX) 500 MG Oral Tab Take 1 tablet (500 mg total) by mouth 2 (two) times daily. 60 tablet 0     ondansetron (ZOFRAN) 4 mg tablet Take 1 tablet (4 mg total) by  mouth every 8 (eight) hours as needed for Nausea. (Patient not taking: Reported on 2/12/2025) 30 tablet 0     metRONIDAZOLE (FLAGYL) 500 MG Oral Tab Take 1 tablet (500 mg total) by mouth in the morning and 1 tablet (500 mg total) before bedtime. (Patient not taking: Reported on 2/12/2025) 14 tablet 0     doxylamine-pyridoxine 10-10 MG Oral Tab EC Take 2 tablets by mouth nightly. (Patient not taking: Reported on 2/12/2025) 120 tablet 0     prenatal vitamin with DHA 27-0.8-228 MG Oral Cap Take 1 capsule by mouth daily.      [2]   Current Facility-Administered Medications Ordered in Epic   Medication Dose Route Frequency Provider Last Rate Last Admin    dextrose in lactated ringers 5% infusion   Intravenous Continuous Damaris Deshpande MD        lactated ringers infusion   Intravenous PRN Damaris Deshpaned MD        lactated ringers IV bolus 500 mL  500 mL Intravenous PRN Damaris Deshpande MD        acetaminophen (Tylenol Extra Strength) tab 500 mg  500 mg Oral Q6H PRN Damaris Deshpande MD        acetaminophen (Tylenol Extra Strength) tab 1,000 mg  1,000 mg Oral Q6H PRN Damaris Deshpande MD        ibuprofen (Motrin) tab 600 mg  600 mg Oral Once PRN Damaris Deshpande MD        ondansetron (Zofran) 4 MG/2ML injection 4 mg  4 mg Intravenous Q6H PRN Damaris Deshpande MD        oxyTOCIN in sodium chloride 0.9% (Pitocin) 30 Units/500mL infusion premix  62.5-900 kenia-units/min Intravenous Continuous Damaris Deshpande MD        terbutaline (Brethine) 1 MG/ML injection 0.25 mg  0.25 mg Subcutaneous PRN Damaris Deshpande MD        sodium citrate-citric acid (Bicitra) 500-334 MG/5ML oral solution 30 mL  30 mL Oral PRN Damaris Deshpande MD        lidocaine PF (Xylocaine-MPF) 1% injection  30 mL Intradermal Once Damaris Deshpande MD        fentaNYL (Sublimaze) 50 mcg/mL injection 50 mcg  50 mcg Intravenous Q30 Min PRN Damaris Deshpande MD        calcium carbonate (Tums) chewable tab 1,000 mg  1,000 mg Oral Q4H PRN Damaris Deshpande MD        fentaNYL-bupivacaine 2 mcg/mL-0.125% in sodium chloride 0.9% 100  mL EPIDURAL infusion premix   Epidural Continuous Rajwinder Araiza MD        fentaNYL (Sublimaze) 50 mcg/mL injection 100 mcg  100 mcg Epidural Once Rajwinder Araiza MD        bupivacaine PF (Marcaine) 0.25% injection  20 mL Epidural Once Rajwinder Araiza MD        ePHEDrine (PF) 25 MG/5 ML injection 5 mg  5 mg Intravenous PRN Rajwinder Araiza MD        nalbuphine (Nubain) 10 mg/mL injection 2.5 mg  2.5 mg Intravenous Q15 Min PRN Rajwinder Araiza MD         No current [facility]-ordered outpatient medications on file.   [3] No Known Allergies

## 2025-02-20 NOTE — TRIAGE
[facility]  part of [facility]      Triage Note    Valeria Hayes Patient Status:  Outpatient    1995 MRN [id number]   Location [facility] Attending Deanna Diego MD   Hosp Day # 0 PCP None Pcp      Para:   Estimated Date of Delivery: 25  Gestation: 39w5d    Chief Complaint    R/o Labor         Allergies:  Allergies[1]    Orders Placed This Encounter   Procedures    Comp Metabolic Panel (14)    CBC With Differential With Platelet    Protein/Creatinine Ratio, Urine Random       Lab Results   Component Value Date    WBC 14.0 (H) 2025    HGB 10.8 (L) 2025    HCT 31.8 (L) 2025    .0 2025    CREATSERUM 0.57 2025    BUN 10 2025     (L) 2025    K 4.3 2025    CL 99 2025    CO2 23.0 2025    GLU 78 2025    CA 9.1 2025    ALB 4.2 2025    ALKPHO 148 (H) 2025    BILT 1.1 2025    TP 7.4 2025    AST 28 2025    ALT 14 2025    LIP 45 2024    MG 1.9 2024    RPR Nonreactive 2025       Clinitek UA  Lab Results   Component Value Date    GLUUR 100 (A) 2024    SPECGRAVITY 1.025 2025       UA  Lab Results   Component Value Date    COLORUR Yellow 2024    CLARITY Ex.Turbid (A) 2024    SPECGRAVITY 1.025 2025    PROUR 20 (A) 2024    GLUUR 100 (A) 2024    KETUR >150 (A) 2024    BILUR Negative 2024    BLOODURINE Negative 2024    NITRITE Negative 2025    UROBILINOGEN Normal 2024    LEUUR Negative 2024       Vitals:    25 1815 25 1830 25 1845 25 1900   BP: 106/65 108/65 110/71 116/70   BP Location:    Left arm   Pulse: 78 81 68 89       NST  Variability: Moderate                       Decelerations: None            Baseline: 120 BPM           Uterine Irritability: No           Contractions: Irregular                                         Acoustic Stimulator: Yes           Nonstress Test Interpretation: Reactive           Nonstress Test Second Interpretation: Reactive          FHR Category: Category I             Additional Comments       Chief Complaint   Patient presents with    R/o Labor     Pt sent from OB appointment for reevaluation for labor. Pt cervix 2/80/-1. Pt with elevated BPs. CBC,CMP, and PC ratio sent and all wnl. BP wnl after 4 hours. Pt administered nubain vistiril IM for pain. Pt cervix unchanged after 4 hours. Pt dc home after receiving ambien 5 mg PO. Pt dc with labor precautions and kick counts. Pt and SO with verbalized understanding.     Rose ROJAS RN  2/19/2025 7:16 PM         [1] No Known Allergies

## 2025-02-20 NOTE — H&P
[facility]  part of [facility]    History & Physical    Valeria Hayes Patient Status:  Inpatient    1995 MRN [id number]   Location [facility] Attending Damaris Deshpande MD   Hosp Day # 0 PCP None Pcp     Date of Admission:  2025    SUBJECTIVE:    Valeria Hayes is a 30 year old , with EDC 2025, by Ultrasound giving EGA 39w6d, who is being admitted for labor management.  Seen in triage a couple of times yesterday due to r/o labor.  Returns today with painful contractions.   Now comfortable with epidural.  SROM 1425--clear    Blood:  AB +  rubella immune.  Hep B negative.  GBS neg      Problem List:   Patient Active Problem List    Diagnosis    Normal labor (HCC)    Antepartum anemia (HCC)     Recent Labs     24  1121 25  1135   RBC 3.86 3.17* 3.41*   HGB 12.3 10.2* 9.9*   HCT 35.1 29.9* 30.4*   MCV 90.9 94.3 89.1   MCH 31.9 32.2 29.0   MCHC 35.0 34.1 32.6   RDW 12.3 11.7 12.6   NEPRELIM 12.38*  --  8.26*   WBC 15.7* 8.4 11.1*   .0 198.0 232.0           Lab Results   Component Value Date    MARISSA 6 (L) 2025       If Hb less than 11, start iron & then recheck CBC/ferritin in one month    Decreased MCV, try iron x one month, repeat CBC.  If not improved significantly, then iron studies (ferritin, TIBC), Hb electorphoresis    Normal MCV: Hb electrophoresis    Increased MCV: check for vit B12, folate    If Hb<8, MFM & heme consults and consider transfusion           Genital herpes affecting pregnancy in first trimester (HCC)     Patient states [facility] [place].  Thinks it was through urine test.  Explained indication for actual cultures. Typically 3 outbreaks per year.  Plan for 36 week suppression.        Marijuana use during pregnancy (HCC)     2024: states has stopped -- aware of random urine tox  Smokes 3x/day.  Explained maternal and fetal risks.  Reviewed UDS.         Anxiety and depression     No meds.  Requesting Naveen Hendricks.  Orders placed.       Genetic testing     MFM NIPT rx'ed      GERD (gastroesophageal reflux disease)     On omeprazole       14 weeks gestation of pregnancy ([facility])     Maternal Fetal Medicine recommendations:  Follow-up growth & BPP ultrasound at 32 weeks.  Weekly NST's at 36 weeks.  Confirmatory testing neg    New OB done at 14 wks.  Equivocal TPAL.  Follow up confirmatory testing.  Results not back at time of New OB.        Obstetric History:   OB History    Para Term  AB Living   1             SAB IAB Ectopic Multiple Live Births                  # Outcome Date GA Lbr Maco/2nd Weight Sex Type Anes PTL Lv   1 Current              Past Medical History:   Past Medical History:    Anemia    Anxiety    Asthma ([facility])    Decorative tattoo    Depression    Genital herpes simplex     Past Social History: No past surgical history on file.  Family History:   Family History   Problem Relation Age of Onset    Diabetes Father     Diabetes Maternal Grandmother     Cancer Maternal Grandmother     Cancer Paternal Grandfather      Social History:   Social History     Tobacco Use    Smoking status: Former     Current packs/day: 0.00     Types: Cigarettes     Quit date:      Years since quittin.1     Passive exposure: Never    Smokeless tobacco: Former     Quit date:    Substance Use Topics    Alcohol use: Not Currently     Comment: Social prior to pregnancy       Home Meds: Prescriptions Prior to Admission[1]    Allergies: Allergies[2]      OBJECTIVE:    Temp:  [98 °F (36.7 °C)] 98 °F (36.7 °C)  Pulse:  [] 99  BP: ()/(51-91) 111/72  SpO2:  [98 %-100 %] 99 %      General: Alert and Oriented  Abdomen: Soft, Gravid    Cervix  4/90/-1    Ctx q 5-6 min  Fetal heart tones:  Baseline 130   Fetal heart variability: moderate and reactive  Decelerations: No      Lab Review:  Results for orders placed or performed during the hospital encounter of  02/20/25   Comp Metabolic Panel (14)    Collection Time: 02/20/25  2:23 PM   Result Value Ref Range    Glucose 89 70 - 99 mg/dL    Sodium 134 (L) 136 - 145 mmol/L    Potassium 4.0 3.5 - 5.1 mmol/L    Chloride 101 98 - 112 mmol/L    CO2 22.0 21.0 - 32.0 mmol/L    Anion Gap 11 0 - 18 mmol/L    BUN 9 9 - 23 mg/dL    Creatinine 0.58 0.55 - 1.02 mg/dL    BUN/CREA Ratio 15.5 10.0 - 20.0    Calcium, Total 8.9 8.7 - 10.4 mg/dL    Calculated Osmolality 276 275 - 295 mOsm/kg    eGFR-Cr 126 >=60 mL/min/1.73m2    ALT 12 10 - 49 U/L    AST 23 <34 U/L    Alkaline Phosphatase 149 (H) 37 - 98 U/L    Bilirubin, Total 1.2 0.3 - 1.2 mg/dL    Total Protein 7.3 5.7 - 8.2 g/dL    Albumin 4.1 3.2 - 4.8 g/dL    Globulin  3.2 2.0 - 3.5 g/dL    A/G Ratio 1.3 1.0 - 2.0   T Pallidum Screening Cascade    Collection Time: 02/20/25  2:23 PM   Result Value Ref Range    Treponemal Antibodies Reactive (A) Nonreactive    ABORH (Blood Type)    Collection Time: 02/20/25  2:23 PM   Result Value Ref Range    ABO BLOOD TYPE AB     RH BLOOD TYPE Positive    Antibody Screen    Collection Time: 02/20/25  2:23 PM   Result Value Ref Range    Antibody Screen Negative           ASSESSMENT:    1.  Labor       PLAN:    Will add pitocin augmentation  GBS neg  FHTs reassuring         Damaris Deshpande MD  2/20/2025  5:15 PM        [1]   Medications Prior to Admission   Medication Sig Dispense Refill Last Dose/Taking    omeprazole 20 MG Oral Capsule Delayed Release Take 1 capsule (20 mg total) by mouth every morning before breakfast.   Taking    valACYclovir (VALTREX) 500 MG Oral Tab Take 1 tablet (500 mg total) by mouth 2 (two) times daily. 60 tablet 0 Taking    prenatal vitamin with DHA 27-0.8-228 MG Oral Cap Take 1 capsule by mouth daily.   Taking    ondansetron (ZOFRAN) 4 mg tablet Take 1 tablet (4 mg total) by mouth every 8 (eight) hours as needed for Nausea. (Patient not taking: Reported on 2/12/2025) 30 tablet 0     metRONIDAZOLE (FLAGYL) 500 MG Oral Tab Take 1  tablet (500 mg total) by mouth in the morning and 1 tablet (500 mg total) before bedtime. (Patient not taking: Reported on 2/12/2025) 14 tablet 0     doxylamine-pyridoxine 10-10 MG Oral Tab EC Take 2 tablets by mouth nightly. (Patient not taking: Reported on 2/12/2025) 120 tablet 0    [2] No Known Allergies

## 2025-02-20 NOTE — TELEPHONE ENCOUNTER
39w6d.  Patient had gone to L&D on 2/19 and told to go home.  Patient having contractions all 24 hours of 2/19 and still having them today and they are getting more painful.    Pls advise

## 2025-02-20 NOTE — ANESTHESIA PROCEDURE NOTES
Labor Analgesia    Date/Time: 2/20/2025 3:50 PM    Performed by: Rajwinder Araiza MD  Authorized by: Rajwinder Araiza MD      General Information and Staff    Start Time:  2/20/2025 3:50 PM  End Time:  2/20/2025 4:03 PM  Anesthesiologist:  Rajwinder Araiza MD  Performed by:  Anesthesiologist  Patient Location:  OB  Reason for Block: labor epidural    Preanesthetic Checklist: patient identified, IV checked, site marked, risks and benefits discussed, monitors and equipment checked, pre-op evaluation, timeout performed, anesthesia consent and sterile technique used      Procedure Details    Patient Position:  Sitting  Prep: ChloraPrep    Monitoring:  Heart rate  Approach:  Midline    Epidural Needle    Injection Technique:  LONNIE saline  Needle Type:  Tuohy  Needle Gauge:  18 G  Needle Length:  3.5 in  Needle Insertion Depth:  5  Location:  L2-3    Spinal Needle      Catheter    Catheter Type:  Multi-orifice  Catheter Size:  20 G  Catheter at Skin Depth:  11  Test Dose:  Negative    Assessment    Sensory Level:  T10    Additional Comments

## 2025-02-20 NOTE — TELEPHONE ENCOUNTER
39w6d. Valeria calling with ctxs every 6 mins apart, states they are 10/10. She states when she is having a ctx she falls on the floor and has a hard time getting up. She went to [facility] yesterday, twice and was discharged home d/t irregular cycles and unchanged SVE. She states she has been laboring since midnight yesterday and she has tried the at home recommendations and they are not work. She states +fetal movement, denies leakage of fluid, vaginal bleeding or ctxs. Recommendations to return to [facility] is ctxs are more consistent and increase in pain.     [facility] triage called and informed.     Dr. Deshpande on-call notified of above.

## 2025-02-20 NOTE — PROGRESS NOTES
Pt is a 30 year old female admitted to TR2/TR2-A.     Chief Complaint   Patient presents with    R/o Labor     Pt. States she has been having contractions since yesterday.  Pt. States they are worse today. Since 10am.  Denies leaking fluid.  Reports positive fetal movement.      Pt is  39w6d intra-uterine pregnancy.  History obtained, pt. Oriented to room, staff, and plan of care.

## 2025-02-21 PROCEDURE — 59510 CESAREAN DELIVERY: CPT | Performed by: OBSTETRICS & GYNECOLOGY

## 2025-02-21 PROCEDURE — 59514 CESAREAN DELIVERY ONLY: CPT | Performed by: OBSTETRICS & GYNECOLOGY

## 2025-02-21 RX ORDER — LIDOCAINE HCL/EPINEPHRINE/PF 2%-1:200K
VIAL (ML) INJECTION AS NEEDED
Status: DISCONTINUED | OUTPATIENT
Start: 2025-02-21 | End: 2025-02-21 | Stop reason: SURG

## 2025-02-21 RX ORDER — ACETAMINOPHEN 500 MG
1000 TABLET ORAL ONCE
Status: DISCONTINUED | OUTPATIENT
Start: 2025-02-21 | End: 2025-02-21 | Stop reason: HOSPADM

## 2025-02-21 RX ORDER — SIMETHICONE 80 MG
80 TABLET,CHEWABLE ORAL 3 TIMES DAILY PRN
Status: DISCONTINUED | OUTPATIENT
Start: 2025-02-21 | End: 2025-02-23

## 2025-02-21 RX ORDER — SODIUM CHLORIDE, SODIUM LACTATE, POTASSIUM CHLORIDE, CALCIUM CHLORIDE 600; 310; 30; 20 MG/100ML; MG/100ML; MG/100ML; MG/100ML
INJECTION, SOLUTION INTRAVENOUS CONTINUOUS
Status: DISCONTINUED | OUTPATIENT
Start: 2025-02-21 | End: 2025-02-23

## 2025-02-21 RX ORDER — FAMOTIDINE 20 MG/1
20 TABLET, FILM COATED ORAL ONCE
Status: COMPLETED | OUTPATIENT
Start: 2025-02-21 | End: 2025-02-21

## 2025-02-21 RX ORDER — HYDROMORPHONE HYDROCHLORIDE 1 MG/ML
0.6 INJECTION, SOLUTION INTRAMUSCULAR; INTRAVENOUS; SUBCUTANEOUS
Status: ACTIVE | OUTPATIENT
Start: 2025-02-21 | End: 2025-02-22

## 2025-02-21 RX ORDER — ACETAMINOPHEN 325 MG/1
650 TABLET ORAL EVERY 6 HOURS PRN
Status: ACTIVE | OUTPATIENT
Start: 2025-02-21 | End: 2025-02-22

## 2025-02-21 RX ORDER — IBUPROFEN 600 MG/1
600 TABLET, FILM COATED ORAL EVERY 6 HOURS
Status: DISCONTINUED | OUTPATIENT
Start: 2025-02-22 | End: 2025-02-23

## 2025-02-21 RX ORDER — METOCLOPRAMIDE HYDROCHLORIDE 5 MG/ML
INJECTION INTRAMUSCULAR; INTRAVENOUS
Status: COMPLETED
Start: 2025-02-21 | End: 2025-02-21

## 2025-02-21 RX ORDER — GABAPENTIN 300 MG/1
300 CAPSULE ORAL EVERY 8 HOURS PRN
Status: DISCONTINUED | OUTPATIENT
Start: 2025-02-21 | End: 2025-02-23

## 2025-02-21 RX ORDER — KETOROLAC TROMETHAMINE 30 MG/ML
30 INJECTION, SOLUTION INTRAMUSCULAR; INTRAVENOUS EVERY 6 HOURS
Status: DISPENSED | OUTPATIENT
Start: 2025-02-21 | End: 2025-02-22

## 2025-02-21 RX ORDER — NALBUPHINE HYDROCHLORIDE 10 MG/ML
2.5 INJECTION INTRAMUSCULAR; INTRAVENOUS; SUBCUTANEOUS
Status: DISCONTINUED | OUTPATIENT
Start: 2025-02-21 | End: 2025-02-23

## 2025-02-21 RX ORDER — ONDANSETRON 2 MG/ML
4 INJECTION INTRAMUSCULAR; INTRAVENOUS ONCE AS NEEDED
Status: ACTIVE | OUTPATIENT
Start: 2025-02-21 | End: 2025-02-21

## 2025-02-21 RX ORDER — PROCHLORPERAZINE EDISYLATE 5 MG/ML
5 INJECTION INTRAMUSCULAR; INTRAVENOUS ONCE AS NEEDED
Status: ACTIVE | OUTPATIENT
Start: 2025-02-21 | End: 2025-02-21

## 2025-02-21 RX ORDER — DEXTROSE, SODIUM CHLORIDE, SODIUM LACTATE, POTASSIUM CHLORIDE, AND CALCIUM CHLORIDE 5; .6; .31; .03; .02 G/100ML; G/100ML; G/100ML; G/100ML; G/100ML
INJECTION, SOLUTION INTRAVENOUS CONTINUOUS
Status: DISCONTINUED | OUTPATIENT
Start: 2025-02-21 | End: 2025-02-23

## 2025-02-21 RX ORDER — NALOXONE HYDROCHLORIDE 0.4 MG/ML
0.08 INJECTION, SOLUTION INTRAMUSCULAR; INTRAVENOUS; SUBCUTANEOUS
Status: ACTIVE | OUTPATIENT
Start: 2025-02-21 | End: 2025-02-22

## 2025-02-21 RX ORDER — DOCUSATE SODIUM 100 MG/1
100 CAPSULE, LIQUID FILLED ORAL
Status: DISCONTINUED | OUTPATIENT
Start: 2025-02-21 | End: 2025-02-23

## 2025-02-21 RX ORDER — FAMOTIDINE 10 MG/ML
20 INJECTION, SOLUTION INTRAVENOUS ONCE
Status: COMPLETED | OUTPATIENT
Start: 2025-02-21 | End: 2025-02-21

## 2025-02-21 RX ORDER — KETOROLAC TROMETHAMINE 30 MG/ML
30 INJECTION, SOLUTION INTRAMUSCULAR; INTRAVENOUS ONCE
Status: COMPLETED | OUTPATIENT
Start: 2025-02-21 | End: 2025-02-21

## 2025-02-21 RX ORDER — HALOPERIDOL 5 MG/ML
0.5 INJECTION INTRAMUSCULAR ONCE AS NEEDED
Status: ACTIVE | OUTPATIENT
Start: 2025-02-21 | End: 2025-02-21

## 2025-02-21 RX ORDER — HYDROCODONE BITARTRATE AND ACETAMINOPHEN 7.5; 325 MG/1; MG/1
2 TABLET ORAL EVERY 6 HOURS PRN
Status: ACTIVE | OUTPATIENT
Start: 2025-02-21 | End: 2025-02-22

## 2025-02-21 RX ORDER — DIPHENHYDRAMINE HYDROCHLORIDE 50 MG/ML
12.5 INJECTION INTRAMUSCULAR; INTRAVENOUS EVERY 4 HOURS PRN
Status: DISCONTINUED | OUTPATIENT
Start: 2025-02-21 | End: 2025-02-22

## 2025-02-21 RX ORDER — METOCLOPRAMIDE 10 MG/1
10 TABLET ORAL ONCE
Status: COMPLETED | OUTPATIENT
Start: 2025-02-21 | End: 2025-02-21

## 2025-02-21 RX ORDER — FAMOTIDINE 10 MG/ML
INJECTION, SOLUTION INTRAVENOUS
Status: DISPENSED
Start: 2025-02-21 | End: 2025-02-21

## 2025-02-21 RX ORDER — HYDROMORPHONE HYDROCHLORIDE 1 MG/ML
0.4 INJECTION, SOLUTION INTRAMUSCULAR; INTRAVENOUS; SUBCUTANEOUS
Status: ACTIVE | OUTPATIENT
Start: 2025-02-21 | End: 2025-02-22

## 2025-02-21 RX ORDER — BISACODYL 10 MG
10 SUPPOSITORY, RECTAL RECTAL ONCE AS NEEDED
Status: DISCONTINUED | OUTPATIENT
Start: 2025-02-21 | End: 2025-02-23

## 2025-02-21 RX ORDER — MORPHINE SULFATE 1 MG/ML
INJECTION, SOLUTION EPIDURAL; INTRATHECAL; INTRAVENOUS AS NEEDED
Status: DISCONTINUED | OUTPATIENT
Start: 2025-02-21 | End: 2025-02-21 | Stop reason: SURG

## 2025-02-21 RX ORDER — ONDANSETRON 2 MG/ML
4 INJECTION INTRAMUSCULAR; INTRAVENOUS EVERY 6 HOURS PRN
Status: DISCONTINUED | OUTPATIENT
Start: 2025-02-21 | End: 2025-02-23

## 2025-02-21 RX ORDER — NALBUPHINE HYDROCHLORIDE 10 MG/ML
2.5 INJECTION INTRAMUSCULAR; INTRAVENOUS; SUBCUTANEOUS EVERY 4 HOURS PRN
Status: DISPENSED | OUTPATIENT
Start: 2025-02-21 | End: 2025-02-22

## 2025-02-21 RX ORDER — CITRIC ACID/SODIUM CITRATE 334-500MG
SOLUTION, ORAL ORAL
Status: COMPLETED
Start: 2025-02-21 | End: 2025-02-21

## 2025-02-21 RX ORDER — AMMONIA 15 % (W/V)
0.3 AMPUL (EA) INHALATION AS NEEDED
Status: DISCONTINUED | OUTPATIENT
Start: 2025-02-21 | End: 2025-02-23

## 2025-02-21 RX ORDER — SODIUM CHLORIDE, SODIUM LACTATE, POTASSIUM CHLORIDE, CALCIUM CHLORIDE 600; 310; 30; 20 MG/100ML; MG/100ML; MG/100ML; MG/100ML
INJECTION, SOLUTION INTRAVENOUS CONTINUOUS PRN
Status: DISCONTINUED | OUTPATIENT
Start: 2025-02-21 | End: 2025-02-21 | Stop reason: SURG

## 2025-02-21 RX ORDER — DIPHENHYDRAMINE HCL 25 MG
25 CAPSULE ORAL EVERY 4 HOURS PRN
Status: DISCONTINUED | OUTPATIENT
Start: 2025-02-21 | End: 2025-02-22

## 2025-02-21 RX ORDER — ACETAMINOPHEN 500 MG
1000 TABLET ORAL EVERY 6 HOURS
Status: DISCONTINUED | OUTPATIENT
Start: 2025-02-21 | End: 2025-02-23

## 2025-02-21 RX ORDER — HYDROCODONE BITARTRATE AND ACETAMINOPHEN 7.5; 325 MG/1; MG/1
1 TABLET ORAL EVERY 6 HOURS PRN
Status: ACTIVE | OUTPATIENT
Start: 2025-02-21 | End: 2025-02-22

## 2025-02-21 RX ORDER — METOCLOPRAMIDE HYDROCHLORIDE 5 MG/ML
10 INJECTION INTRAMUSCULAR; INTRAVENOUS ONCE
Status: COMPLETED | OUTPATIENT
Start: 2025-02-21 | End: 2025-02-21

## 2025-02-21 RX ADMIN — SODIUM CHLORIDE, SODIUM LACTATE, POTASSIUM CHLORIDE, CALCIUM CHLORIDE: 600; 310; 30; 20 INJECTION, SOLUTION INTRAVENOUS at 00:46:00

## 2025-02-21 RX ADMIN — LIDOCAINE HCL/EPINEPHRINE/PF 15 ML: 2%-1:200K VIAL (ML) INJECTION at 00:49:00

## 2025-02-21 RX ADMIN — SODIUM CHLORIDE, SODIUM LACTATE, POTASSIUM CHLORIDE, CALCIUM CHLORIDE: 600; 310; 30; 20 INJECTION, SOLUTION INTRAVENOUS at 01:43:00

## 2025-02-21 RX ADMIN — MORPHINE SULFATE 2 MG: 1 INJECTION, SOLUTION EPIDURAL; INTRATHECAL; INTRAVENOUS at 01:06:00

## 2025-02-21 NOTE — OPERATIVE REPORT
[facility]  part of [facility]     Section Delivery / Operative Note    Valeria Hayes Patient Status:  Inpatient    1995 MRN [id number]   Location [facility] Attending Damaris Deshpande MD   Hosp Day # 1 PCP None Pcp       Date of Surgery:  2025    Pre Op Diagnosis:  IUP at 40 wks, Failure to progress.  Fetal tachycardia    Post Op Diagnosis:  same as pre-op    Procedure:  primary low transverse     Surgeon:  Damaris Deshpande MD    Assistant Surgeon:  Dr Flores     Anesthesia: epidural    Complications: none    Antibiotics:  Ancef 2 grams preoperatively    QBL: 280 cc    Sponge and Needle Counts:  Verified    Specimens:  cord blood.  placenta    Findings:  Live male infant, Weight 6#9 and Apgars 8 & 9,  Delivered in OP position.  Nuchal Cord: on shoulder.   clear amniotic fluid noted.    Normal postpartum uterus, tubes, ovaries.  Normal intact placenta.    Indications:  30 year old  presents with labor.  IUPC.  Pitocin.  Fetal tachycardia with variables and intermittent late decels.  Failure to progress despite adequate labor    Operative note:  The patient was prepped and draped in a sterile manner. A time out was performed. After adequate level of anesthesia was ascertained, a Pfannenstiel skin incision was made and extended down to the level of the rectus fascia. The rectus fascia was incised and extended bilaterally with curved Swan scissors.  The rectus muscles were dissected from the rectus fascia superiorly and inferiorly.  The peritoneal cavity was entered uneventfully.  The peritoneum overlying the lower uterine segment was incised and the bladder flap was created.  A transverse uterine incision was made.  The fetal vertex was delivered with the surgeon's hand and mild fundal pressure.   The oropharynx was bulb suctioned.  The remainder of the body was delivered without complication.  After delayed cord clamping of 30 seconds, the  cord was doubly clamped and cut.  The baby was handed off to the awaiting neonatologist.  The placenta was delivered spontaneously.       The uterus was closed in two layer fashion.  The first layer was closed with continuous locking stitch using a 0-Vicryl.  This was followed by an imbricating layer using a continuous suture of 0-Vicryl.    Good hemostasis was noted.     The fascia was closed with a continuous suture of 0-Vicryl starting at either ends and meeting in the midline.  The subcutaneous tissue was copiously irrigated and any bleeding cauterized.  The subcutaneous tissue was closed using 3-0 Plain. The skin was closed using subcuticular suture of 4-0 Vicryl.  Steri Strips placed.      The final needle, sponge, and instrument counts were correct.   cc.  There were no complications.  [place] was draining clear urine.  The patient tolerated the procedure well and was taken to recovery room in stable fashion.      The surgical assistant provided aid in exposure, hemostasis, closure, and other intra operative technical functions to help the surgeon carry out a safe operation and optimize results.       Damaris Deshpande MD  2/21/2025  1:45 AM

## 2025-02-21 NOTE — PLAN OF CARE
Problem: Patient Centered Care  Goal: Patient preferences are identified and integrated in the patient's plan of care  Description: Interventions:  - Provide timely, complete, and accurate information to patient/family  - Incorporate patient and family knowledge, values, beliefs, and cultural backgrounds into the planning and delivery of care  - Encourage patient/family to participate in care and decision-making at the level they choose  - Honor patient and family perspectives and choices  Outcome: Progressing

## 2025-02-21 NOTE — DISCHARGE SUMMARY
[facility]  part of [facility]    Discharge Summary    Valeria Hayes Patient Status:  Inpatient    1995 MRN [id number]   Location [facility] CENTER Attending Damaris Deshpande MD   Hosp Day # 2       Admission Date:  2025    Discharge Date: 2025    Delivering OB Clinician:  Dr Deshpande    EDC: Estimated Date of Delivery: 25    Gestational Age: 40w0d    Antepartum complications:   Patient Active Problem List   Diagnosis    Genital herpes affecting pregnancy in first trimester ([facility])    Marijuana use during pregnancy ([facility])    Anxiety and depression    Genetic testing    GERD (gastroesophageal reflux disease)    14 weeks gestation of pregnancy ([facility])    Antepartum anemia ([facility])    Normal labor ([facility])    Failure to progress in labor ([facility])       Date of Delivery: 2025 Time of Delivery: 1:02 AM    Delivery Type: primary  section, low transverse incision    Baby: Liveborn male   Information for the patient's :  Vega Hayes [[id number]]   6 lb 9.1 oz (2.98 kg)  Apgars:  1 minute: 8   5 minutes: 9        Hospital Course:   Presents with labor.  IUPC.  Pitocin.  Failure to progress.  Fetal tachycardia.  OP.    No complications. Routine delivery and postpartum care    Discharge Physical Exam:   /65 (BP Location: Right arm)   Pulse 83   Temp 97.6 °F (36.4 °C) (Oral)   Resp 14   Wt 137 lb (62.1 kg)   LMP 2024 (Approximate)   SpO2 100%   Breastfeeding Yes   BMI 26.76 kg/m²   General appearance:  alert, appears stated age, cooperative and no distress  Abdominal: soft, non-tender, no rebound  Uterus: firm, nontender, below umbilicus  Incision: clean, dry and intact  Pelvic: deferred  Extremities: Homans sign is negative, no sign of DVT    Discharged Condition: stable    Disposition: home    Plan:     Follow-up appointment in 6 weeks with Dr. Deshpande

## 2025-02-21 NOTE — PLAN OF CARE
Problem: GENITOURINARY - ADULT  Goal: Absence of urinary retention  Description: INTERVENTIONS:  - Assess patient’s ability to void and empty bladder  - Monitor intake/output and perform bladder scan as needed  - Follow urinary retention protocol/standard of care  - Consider collaborating with pharmacy to review patient's medication profile  - Implement strategies to promote bladder emptying  Outcome: Progressing     Problem: POSTPARTUM  Goal: Long Term Goal:Experiences normal postpartum course  Description: INTERVENTIONS:  - Assess and monitor vital signs and lab values.  - Assess fundus and lochia.  - Provide ice/sitz baths for perineum discomfort.  - Monitor healing of incision/episiotomy/laceration, and assess for signs and symptoms of infection and hematoma.  - Assess bladder function and monitor for bladder distention.  - Provide/instruct/assist with pericare as needed.  - Provide VTE prophylaxis as needed.  - Monitor bowel function.  - Encourage ambulation and provide assistance as needed.  - Assess and monitor emotional status and provide social service/psych resources as needed.  - Utilize standard precautions and use personal protective equipment as indicated. Ensure aseptic care of all intravenous lines and invasive tubes/drains.  - Obtain immunization and exposure to communicable diseases history.  Outcome: Progressing  Goal: Optimize infant feeding at the breast  Description: INTERVENTIONS:  - Initiate breast feeding within first hour after birth.   - Monitor effectiveness of current breast feeding efforts.  - Assess support systems available to mother/family.  - Identify cultural beliefs/practices regarding lactation, letdown techniques, maternal food preferences.  - Assess mother's knowledge and previous experience with breast feeding.  - Provide information as needed about early infant feeding cues (e.g., rooting, lip smacking, sucking fingers/hand) versus late cue of crying.  - Discuss/demonstrate  breast feeding aids (e.g., infant sling, nursing footstool/pillows, and breast pumps).  - Encourage mother/other family members to express feelings/concerns, and actively listen.  - Educate father/SO about benefits of breast feeding and how to manage common lactation challenges.  - Recommend avoidance of specific medications or substances incompatible with breast feeding.  - Assess and monitor for signs of nipple pain/trauma.  - Instruct and provide assistance with proper latch.  - Review techniques for milk expression (breast pumping) and storage of breast milk. Provide pumping equipment/supplies, instructions and assistance, as needed.  - Encourage rooming-in and breast feeding on demand.  - Encourage skin-to-skin contact.  - Provide LC support as needed.  - Assess for and manage engorgement.  - Provide breast feeding education handouts and information on community breast feeding support.   Outcome: Progressing  Goal: Establishment of adequate milk supply with medication/procedure interruptions  Description: INTERVENTIONS:  - Review techniques for milk expression (breast pumping).   - Provide pumping equipment/supplies, instructions, and assistance until it is safe to breastfeed infant.  Outcome: Progressing  Goal: Appropriate maternal -  bonding  Description: INTERVENTIONS:  - Assess caregiver- interactions.  - Assess caregiver's emotional status and coping mechanisms.  - Encourage caregiver to participate in  daily care.  - Assess support systems available to mother/family.  - Provide /case management support as needed.  Outcome: Progressing

## 2025-02-21 NOTE — PROGRESS NOTES
2/20/2025, 9:59 PM    Subjective:    Comfortable with epidural.       Objective:  Vitals:    02/20/25 2115 02/20/25 2130 02/20/25 2145 02/20/25 2155   BP: 116/60 114/62 137/72    Pulse: 90 82 90    Temp:    98.2 °F (36.8 °C)   TempSrc:    Oral   SpO2: 100% 100% 100%    Weight:           Intake/Output Summary (Last 24 hours) at 2/20/2025 2159  Last data filed at 2/20/2025 2033  Gross per 24 hour   Intake --   Output 500 ml   Net -500 ml       Fetal heart tones:  FHT :  160s with moderate variability  Decelerations: variables   intermittent ?late decel  Contractions: q 2-3 min   Pit 8 mu/min     Cervical Exam:  4-5/90/-1  anterior cervix very edematous   IUPC placed without difficulty    Assessment:  Minimal cervical change    Plan:  IUPC.  IV benadryl for swollen cervix.  Monitor closely      Damaris Deshpande MD 2/20/2025 9:59 PM

## 2025-02-21 NOTE — PROGRESS NOTES
Patient transferred to mother/baby room 357 per cart in stable condition with baby and personal belongings.  Accompanied by  and staff.  Report given to mother/baby RN Malinda.

## 2025-02-21 NOTE — ANESTHESIA POSTPROCEDURE EVALUATION
Patient: Valeria Haeys    Procedure Summary       Date: 25 Room / Location: [facility] L+D OR  [facility] L+D OR    Anesthesia Start: 1550 Anesthesia Stop:     Procedure:  SECTION (Abdomen) Diagnosis:       Failure to progress in labor (HCC)      (Failure to progress in labor (HCC) [O62.2])    Surgeons: Damaris Deshpande MD Anesthesiologist: Rajwinder Araiza MD    Anesthesia Type: epidural ASA Status: 2 - Emergent            Anesthesia Type: epidural    Vitals Value Taken Time   /131 25 0145   Temp 98.2 °F (36.8 °C) 25 0143   Pulse 118 25 0146   Resp 22 25 0146   SpO2 99 % 25 0146   Vitals shown include unfiled device data.    EM AN Post Evaluation:   Patient Evaluated in PACU  Patient Participation: complete - patient participated  Level of Consciousness: awake  Pain Management: adequate  Airway Patency:patent  Dental exam unchanged from preop  Yes    Cardiovascular Status: acceptable  Respiratory Status: acceptable  Postoperative Hydration acceptable      RAJWINDER ARAIZA MD  2025 1:47 AM

## 2025-02-21 NOTE — LACTATION NOTE
02/21/25 0950   Evaluation Type   Evaluation Type Inpatient   Problems identified   Problems identified Knowledge deficit   Maternal history   Maternal history Anxiety;Depression;Caesarean section   Breastfeeding goal   Breastfeeding goal To maintain breast milk feeding per patient goal   Maternal Assessment   Bilateral Breasts Soft;Symmetrical   Bilateral Nipples Colostrum easily expressed;Small   Prior breastfeeding experience (comment below) Primip   Breastfeeding Assistance Breastfeeding assistance provided with permission;Pumping assistance provided with permission;Breast exam provided with permission;Hand expression provided with permission   Pain assessment   Location/Comment denies   Treatment of Sore Nipples Deeper latch techniques;Expressed breast milk;Lanolin   Guidelines for use of:   Breast pump type Hand Pump   Current use of pump: does not have a pump- encouraged her to call insurance or rent the hospital grade pump.   Suggested use of pump For comfort as needed;Pump each time a supplement is offered   Other (comment) LC assistance offered. Infant was skin to skin prior to LC arrival. Mother's plan is to breast and formula feed. Parents have their own goat milk formula that was approved by the pediatrician today. LC encouraged mother to pump if she is going to consistently use formula. LC reviewed supplementation guidelines and pumping guidelines. Hand pump was given to the mother and LC demonstrated it's use. 22.5mm insert was provided due to smaller nipples and it pulling in a lot of areola. LC demonstrated hand expression and was able to spoonfeed a whole spoonful to the baby. That increased infants hunger cues and LC assisted with a latch on the left side in laid back position. 5 minutes of few suckling bursts noted with a good pulling sensation but did fall asleep quickly and then was not interested in feeding so LC encouraged more skin to skin, hand expression, and hand pump. LC reviewed skin  to skin benefits, gentle waking techqniues, feeding patterns of a baby under 24 hours old, feeding frequency/duration, and expected I&O's. Patient does not have a breast pump so LC encouraged her to call her insurance or rent the hospital grade pump. All questions answered.

## 2025-02-21 NOTE — PROGRESS NOTES
2025, 12:18 AM    Subjective:        Objective:  Vitals:    25 2300 25 2315 25 2330 25 2336   BP: 131/80 129/71 123/64    Pulse: 118 93 90    Temp:    98.7 °F (37.1 °C)   TempSrc:    Oral   SpO2: 99% 99% 100%    Weight:           Intake/Output Summary (Last 24 hours) at 2025 0018  Last data filed at 2025 2300  Gross per 24 hour   Intake 302.08 ml   Output 500 ml   Net -197.92 ml       Fetal heart tones:  FHT :  180 with min-moderate variability  Decelerations: variables  intermittent late decels  Contractions: q 2-3 min x 50 mmHg change.  Pitocin off    Cervical Exam:      Assessment:  Failure to progress.  No cervical change despite adequate labor  Fetal tachycardia    Plan:  Will proceed with .   Discussed risks of surgery including risk of infection, bleeding, and injury to major organs.        Damaris Deshpande MD 2025 12:18 AM

## 2025-02-22 LAB
BASOPHILS # BLD AUTO: 0.03 X10(3) UL (ref 0–0.2)
BASOPHILS NFR BLD AUTO: 0.2 %
DEPRECATED RDW RBC AUTO: 44 FL (ref 35.1–46.3)
EOSINOPHIL # BLD AUTO: 0.18 X10(3) UL (ref 0–0.7)
EOSINOPHIL NFR BLD AUTO: 1.1 %
ERYTHROCYTE [DISTWIDTH] IN BLOOD BY AUTOMATED COUNT: 13.9 % (ref 11–15)
HCT VFR BLD AUTO: 24.3 %
HGB BLD-MCNC: 8 G/DL
IMM GRANULOCYTES # BLD AUTO: 0.14 X10(3) UL (ref 0–1)
IMM GRANULOCYTES NFR BLD: 0.9 %
LYMPHOCYTES # BLD AUTO: 1.27 X10(3) UL (ref 1–4)
LYMPHOCYTES NFR BLD AUTO: 8 %
MCH RBC QN AUTO: 28.8 PG (ref 26–34)
MCHC RBC AUTO-ENTMCNC: 32.9 G/DL (ref 31–37)
MCV RBC AUTO: 87.4 FL
MONOCYTES # BLD AUTO: 0.99 X10(3) UL (ref 0.1–1)
MONOCYTES NFR BLD AUTO: 6.2 %
NEUTROPHILS # BLD AUTO: 13.29 X10 (3) UL (ref 1.5–7.7)
NEUTROPHILS # BLD AUTO: 13.29 X10(3) UL (ref 1.5–7.7)
NEUTROPHILS NFR BLD AUTO: 83.6 %
PLATELET # BLD AUTO: 165 10(3)UL (ref 150–450)
RBC # BLD AUTO: 2.78 X10(6)UL
WBC # BLD AUTO: 15.9 X10(3) UL (ref 4–11)

## 2025-02-22 NOTE — PROGRESS NOTES
[facility]  part of [facility]    Post  Progress Note      Valeria Hayes Patient Status:  Inpatient    1995 MRN [id number]   Location [facility] 3SE Attending Damaris Deshpande MD   Hosp Day # 2 PCP None Pcp       Subjective     Good pain control. Tolerating present diet. Ambulating.    Objective   Vital signs in last 24 hours:  Temp:  [97.6 °F (36.4 °C)-98 °F (36.7 °C)] 97.6 °F (36.4 °C)  Pulse:  [] 83  Resp:  [14-17] 14  BP: (107-132)/(65-72) 119/65  SpO2:  [99 %-100 %] 100 %    Input/Output:    Intake/Output Summary (Last 24 hours) at 2025 0804  Last data filed at 2025 2300  Gross per 24 hour   Intake 306.25 ml   Output 750 ml   Net -443.75 ml       Constitutional: comfortable  Abdomen: soft, nontender  Uterus: fundus firm and nontender  Wound/Incision:  Intact without erythema  Extremities: No calf tenderness      Results:   CBC:    Lab Results   Component Value Date    WBC 15.9 (H) 2025    WBC 14.0 (H) 2025    WBC 11.1 (H) 2025     Lab Results   Component Value Date    HGB 8.0 (L) 2025    HGB 10.8 (L) 2025    HGB 9.9 (L) 2025      Lab Results   Component Value Date    .0 2025    .0 2025    .0 2025        Assessment/Plan   POD #  1 --- stable  Routine PP care  IV venofer    Pt wants baby circumcision.   Risks discussed.           Damaris Deshpande MD  2025  8:04 AM

## 2025-02-22 NOTE — PROGRESS NOTES
[facility]  Anesthesiology Epidural Follow-up Note  2025    Patient name: Valeria Hayes 30 year old female  : 1995  MRN: [id number]    Diagnosis: [unfilled]    S/P:     Pain treatment modality: Duramorph    Current hospital day: Hospital Day: 3    Pain Scores: 2    Current Medications:  Scheduled Meds:   iron sucrose  200 mg Intravenous Daily    acetaminophen  1,000 mg Oral Q6H    ketorolac  30 mg Intravenous Q6H    ibuprofen  600 mg Oral Q6H    docusate sodium  100 mg Oral BID@0600,1800     Continuous Infusions:   lactated ringers Stopped (25 1343)    dextrose in lactated ringers       PRN Meds:.  nalbuphine    gabapentin    magnesium hydroxide    bisacodyl    ondansetron    witch hazel-glycerin    phenylephrine-min oil-kirsty    simethicone    ammonia aromatic    Anticoagulation:       [place] catheter:      Assessment:  No complications from anesthesia    Plan:  P.o. meds as gopal MOLINA MD  Anesthesia Acute Pain Service [phone]

## 2025-02-23 VITALS
OXYGEN SATURATION: 100 % | SYSTOLIC BLOOD PRESSURE: 113 MMHG | WEIGHT: 137 LBS | DIASTOLIC BLOOD PRESSURE: 75 MMHG | HEART RATE: 77 BPM | RESPIRATION RATE: 16 BRPM | BODY MASS INDEX: 27 KG/M2 | TEMPERATURE: 98 F

## 2025-02-23 PROBLEM — Z37.9 NORMAL LABOR (HCC): Status: RESOLVED | Noted: 2025-02-20 | Resolved: 2025-02-23

## 2025-02-23 RX ORDER — ACETAMINOPHEN 500 MG
1000 TABLET ORAL EVERY 6 HOURS
Qty: 30 TABLET | Refills: 0 | Status: SHIPPED | OUTPATIENT
Start: 2025-02-23

## 2025-02-23 RX ORDER — IBUPROFEN 600 MG/1
600 TABLET, FILM COATED ORAL EVERY 6 HOURS
Qty: 30 TABLET | Refills: 1 | Status: SHIPPED | OUTPATIENT
Start: 2025-02-23

## 2025-02-23 RX ORDER — GABAPENTIN 300 MG/1
300 CAPSULE ORAL EVERY 8 HOURS PRN
Qty: 20 CAPSULE | Refills: 0 | Status: SHIPPED | OUTPATIENT
Start: 2025-02-23

## 2025-02-23 NOTE — PLAN OF CARE
Problem: Patient Centered Care  Goal: Patient preferences are identified and integrated in the patient's plan of care  Description: Interventions:  - What would you like us to know as we care for you?   - Provide timely, complete, and accurate information to patient/family  - Incorporate patient and family knowledge, values, beliefs, and cultural backgrounds into the planning and delivery of care  - Encourage patient/family to participate in care and decision-making at the level they choose  - Honor patient and family perspectives and choices  Outcome: Completed     Problem: Patient/Family Goals  Goal: Patient/Family Long Term Goal  Description: Patient's Long Term Goal:   Interventions:  -   - See additional Care Plan goals for specific interventions  Outcome: Completed  Goal: Patient/Family Short Term Goal  Description: Patient's Short Term Goal:     Interventions:   -   - See additional Care Plan goals for specific interventions  Outcome: Completed     Problem: GASTROINTESTINAL - ADULT  Goal: Minimal or absence of nausea and vomiting  Description: INTERVENTIONS:  - Maintain adequate hydration with IV or PO as ordered and tolerated  - Nasogastric tube to low intermittent suction as ordered  - Evaluate effectiveness of ordered antiemetic medications  - Provide nonpharmacologic comfort measures as appropriate  - Advance diet as tolerated, if ordered  - Obtain nutritional consult as needed  - Evaluate fluid balance  Outcome: Completed  Goal: Maintains or returns to baseline bowel function  Description: INTERVENTIONS:  - Assess bowel function  - Maintain adequate hydration with IV or PO as ordered and tolerated  - Evaluate effectiveness of GI medications  - Encourage mobilization and activity  - Obtain nutritional consult as needed  - Establish a toileting routine/schedule  - Consider collaborating with pharmacy to review patient's medication profile  Outcome: Completed  Goal: Maintains adequate nutritional intake  (undernourished)  Description: INTERVENTIONS:  - Monitor percentage of each meal consumed  - Identify factors contributing to decreased intake, treat as appropriate  - Assist with meals as needed  - Monitor I&O, WT and lab values  - Obtain nutritional consult as needed  - Optimize oral hygiene and moisture  - Encourage food from home; allow for food preferences  - Enhance eating environment  Outcome: Completed  Goal: Achieves appropriate nutritional intake (bariatric)  Description: INTERVENTIONS:  - Monitor for over-consumption  - Identify factors contributing to increased intake, treat as appropriate  - Monitor I&O, WT and lab values  - Obtain nutritional consult as needed  - Evaluate psychosocial factors contributing to over-consumption  Outcome: Completed     Problem: GENITOURINARY - ADULT  Goal: Absence of urinary retention  Description: INTERVENTIONS:  - Assess patient’s ability to void and empty bladder  - Monitor intake/output and perform bladder scan as needed  - Follow urinary retention protocol/standard of care  - Consider collaborating with pharmacy to review patient's medication profile  - Implement strategies to promote bladder emptying  Outcome: Completed     Problem: POSTPARTUM  Goal: Long Term Goal:Experiences normal postpartum course  Description: INTERVENTIONS:  - Assess and monitor vital signs and lab values.  - Assess fundus and lochia.  - Provide ice/sitz baths for perineum discomfort.  - Monitor healing of incision/episiotomy/laceration, and assess for signs and symptoms of infection and hematoma.  - Assess bladder function and monitor for bladder distention.  - Provide/instruct/assist with pericare as needed.  - Provide VTE prophylaxis as needed.  - Monitor bowel function.  - Encourage ambulation and provide assistance as needed.  - Assess and monitor emotional status and provide social service/psych resources as needed.  - Utilize standard precautions and use personal protective equipment as  indicated. Ensure aseptic care of all intravenous lines and invasive tubes/drains.  - Obtain immunization and exposure to communicable diseases history.  Outcome: Completed  Goal: Optimize infant feeding at the breast  Description: INTERVENTIONS:  - Initiate breast feeding within first hour after birth.   - Monitor effectiveness of current breast feeding efforts.  - Assess support systems available to mother/family.  - Identify cultural beliefs/practices regarding lactation, letdown techniques, maternal food preferences.  - Assess mother's knowledge and previous experience with breast feeding.  - Provide information as needed about early infant feeding cues (e.g., rooting, lip smacking, sucking fingers/hand) versus late cue of crying.  - Discuss/demonstrate breast feeding aids (e.g., infant sling, nursing footstool/pillows, and breast pumps).  - Encourage mother/other family members to express feelings/concerns, and actively listen.  - Educate father/SO about benefits of breast feeding and how to manage common lactation challenges.  - Recommend avoidance of specific medications or substances incompatible with breast feeding.  - Assess and monitor for signs of nipple pain/trauma.  - Instruct and provide assistance with proper latch.  - Review techniques for milk expression (breast pumping) and storage of breast milk. Provide pumping equipment/supplies, instructions and assistance, as needed.  - Encourage rooming-in and breast feeding on demand.  - Encourage skin-to-skin contact.  - Provide LC support as needed.  - Assess for and manage engorgement.  - Provide breast feeding education handouts and information on community breast feeding support.   Outcome: Completed  Goal: Establishment of adequate milk supply with medication/procedure interruptions  Description: INTERVENTIONS:  - Review techniques for milk expression (breast pumping).   - Provide pumping equipment/supplies, instructions, and assistance until it is  safe to breastfeed infant.  Outcome: Completed  Goal: Appropriate maternal -  bonding  Description: INTERVENTIONS:  - Assess caregiver- interactions.  - Assess caregiver's emotional status and coping mechanisms.  - Encourage caregiver to participate in  daily care.  - Assess support systems available to mother/family.  - Provide /case management support as needed.  Outcome: Completed       Discharge order received from MD.  Postpartum folder given, discharge medication form reviewed, signed and given to patient. ID Band matched with baby band. Patient informed when to make a follow-up appointment with OB. Mother is interacting appropriately with baby. Verbalized understanding of follow-up instructions. Discharged in stable condition via wheelchair.

## 2025-02-23 NOTE — PROGRESS NOTES
[facility]  part of [facility]    Post  Progress Note      Valeria Hayes Patient Status:  Inpatient    1995 MRN [id number]   Location [facility] 3SE Attending Damaris Deshpande MD   Hosp Day # 3 PCP None Pcp       Subjective     Good pain control. Tolerating present diet. Ambulating.   Wants to go home today     Objective   Vital signs in last 24 hours:  Temp:  [98.2 °F (36.8 °C)] 98.2 °F (36.8 °C)  Pulse:  [75-87] 87  Resp:  [18] 18  BP: (108-124)/(61-82) 112/75    Input/Output:  No intake or output data in the 24 hours ending 25 1049    Constitutional: comfortable  Abdomen: soft, nontender  Uterus: fundus firm and nontender  Wound/Incision:  Intact without erythema  Extremities: No calf tenderness      Results:   CBC:    Lab Results   Component Value Date    WBC 15.9 (H) 2025    WBC 14.0 (H) 2025    WBC 11.1 (H) 2025     Lab Results   Component Value Date    HGB 8.0 (L) 2025    HGB 10.8 (L) 2025    HGB 9.9 (L) 2025      Lab Results   Component Value Date    .0 2025    .0 2025    .0 2025        Assessment/Plan   POD #  2 --- stable    Home  RTC 6 week  Rx Motrin 600 mg  Rx Tylenol 1gm  Rx gabapentin 300 mg  Prenatal vitamins/iron      Damaris Deshpande MD  2025  10:49 AM

## 2025-02-23 NOTE — DISCHARGE INSTRUCTIONS
Call your doctor if you are experiencing heavy bleeding, increased pain not relieved with medication, temperature over 100.3, severe headache, change in vision, calf pain/swelling, change in mood/depression.    Monitor incision daily. Signs of infection: redness, swelling, odorous discharge, warm to touch, fever.    Driving:  When driving maneuvers feel safe. Not taking narcotics.    Nothing in the vagina for 6 weeks    No heaving lifting over 10-15# until cleared by OB.

## 2025-02-24 LAB — RPR SER QL: NONREACTIVE

## 2025-02-26 LAB
TREPONEMAL ANTIBODIES, TPPA: NON REACTIVE
TREPONEMAL ANTIBODIES, TPPA: NON REACTIVE

## 2025-03-16 ENCOUNTER — TELEPHONE (OUTPATIENT)
Dept: OBGYN UNIT | Facility: HOSPITAL | Age: 30
End: 2025-03-16

## 2025-03-16 PROBLEM — O47.9 FALSE LABOR (HCC): Status: ACTIVE | Noted: 2025-03-16

## 2025-03-16 NOTE — PROGRESS NOTES
Unable to leave message to call physicians office with questions. Leora call letter sent via MY CHART.

## 2025-04-10 ENCOUNTER — POSTPARTUM (OUTPATIENT)
Dept: OBGYN CLINIC | Facility: CLINIC | Age: 30
End: 2025-04-10
Payer: COMMERCIAL

## 2025-04-10 VITALS
SYSTOLIC BLOOD PRESSURE: 105 MMHG | BODY MASS INDEX: 24 KG/M2 | DIASTOLIC BLOOD PRESSURE: 67 MMHG | HEART RATE: 99 BPM | WEIGHT: 122 LBS

## 2025-04-10 NOTE — PROGRESS NOTES
HPI    Valeria Hayes is a 30 year old female  here for 6 week post-partum visit.  Patient delivered a  male infant on 2025 by LTCS for failure to process.  OP    Patient is bottle feeding.   Patient denies symptoms of depression, Cowley  depression scale score of 2 out of 30.    Last pap 2024  LMP 4/3/25    OBSTETRIC HISTORY  OB History    Para Term  AB Living   1 1 1   1   SAB IAB Ectopic Multiple Live Births      0 1      # Outcome Date GA Lbr Maco/2nd Weight Sex Type Anes PTL Lv   1 Term 25 40w0d  6 lb 9.1 oz (2.98 kg) M Caesarean EPI N RICHARD      Complications: Fetal tachycardia, Variable decelerations, Late decelerations, Failure to Progress in First Stage       MEDICATIONS:  Medications - Current[1]    ALLERGIES:  Allergies[2]    PHYSICAL EXAM  Blood pressure 105/67, pulse 99, weight 122 lb (55.3 kg), last menstrual period 2024, not currently breastfeeding.  General:  Well nourished, well developed woman in no acute distress  Abdomen:  soft, nontender, no masses  Incision intact and healing well    External Genitalia: normal appearance, hair distribution, and no lesions  Vagina:  Normal appearance without lesions, no abnormal discharge  Cervix:  Normal without tenderness on motion  Uterus: normal in size, contour, position, mobility, without tenderness  Adnexa: normal without masses or tenderness  Perineum: well healed perineum        ASSESSMENT/PLAN  Normal Post partum exam  Information on Mirena IUD.  RTC annual  Pt partner after visit had syncopal episode--did not eat breakfast, taking abx.  Felt much better after juice and ice packs.               [1]   Current Outpatient Medications:     ibuprofen 600 MG Oral Tab, Take 1 tablet (600 mg total) by mouth every 6 (six) hours., Disp: 30 tablet, Rfl: 1    omeprazole 20 MG Oral Capsule Delayed Release, Take 1 capsule (20 mg total) by mouth every morning before breakfast., Disp: , Rfl:     acetaminophen 500  MG Oral Tab, Take 2 tablets (1,000 mg total) by mouth every 6 (six) hours. (Patient not taking: Reported on 4/10/2025), Disp: 30 tablet, Rfl: 0    gabapentin 300 MG Oral Cap, Take 1 capsule (300 mg total) by mouth every 8 (eight) hours as needed (For breakthrough moderate pain). (Patient not taking: Reported on 4/10/2025), Disp: 20 capsule, Rfl: 0    prenatal vitamin with DHA 27-0.8-228 MG Oral Cap, Take 1 capsule by mouth daily. (Patient not taking: Reported on 4/10/2025), Disp: , Rfl:   [2] No Known Allergies

## 2025-06-23 ENCOUNTER — NST DOCUMENTATION (OUTPATIENT)
Dept: OBGYN CLINIC | Facility: CLINIC | Age: 30
End: 2025-06-23

## 2025-06-23 NOTE — NST
Nonstress Test   Patient: Valeria Hayes    Gestation: Unknown    Diagnosis from order:      Problem List:   Patient Active Problem List   Diagnosis    Genital herpes affecting pregnancy in first trimester ([facility])    Marijuana use during pregnancy ([facility])    Anxiety and depression    Genetic testing    GERD (gastroesophageal reflux disease)    14 weeks gestation of pregnancy ([facility])    Antepartum anemia ([facility])    Failure to progress in labor ([facility])    False labor ([facility])       NST:        2/19/2025   NST DOCUMENTATION   Variability 6-25 BPM    6-25 BPM   Accelerations Yes   Decelerations None    None   Baseline 120 BPM    125 BPM   Uterine Irritability No    No   Contractions Irregular    Irregular   Acoustic Stimulator Yes    No   Nonstress Test Interpretation Reactive    Reactive   Nonstress Test Second Interpretation Reactive    Reactive   FHR Category Category I    Category I       Multiple values from one day are sorted in reverse-chronological order         I agree with the above evaluation. NST completed.  Shaggy Hickey DO  6/23/2025  5:44 PM

## 2025-06-24 ENCOUNTER — NST DOCUMENTATION (OUTPATIENT)
Dept: OBGYN CLINIC | Facility: CLINIC | Age: 30
End: 2025-06-24

## 2025-06-24 NOTE — NST
Nonstress Test   Patient: Valeria Hayes    2/11/25 NST    Diagnosis: Cannibis use in pregnancy      NST: 140/mod/R      Shaggy Hickey DO  6/24/2025  10:35 AM

## (undated) NOTE — LETTER
[facility]  Administration of Anesthesia  Valeria CASANOVA agree to be cared for by a physician anesthesiologist alone and/or with a nurse anesthetist, who is specially trained to monitor me and give me medicine to put me to sleep or keep me comfortable during my procedure    I understand that my anesthesiologist and/or anesthetist is not an employee or agent of [facility] or [employer]. He or she works for [facility]    As the patient asking for anesthesia services, I agree to:  Allow the anesthesiologist (anesthesia doctor) to give me medicine and do additional procedures as necessary. Some examples are: Starting or using an “IV” to give me medicine, fluids or blood during my procedure, and having a breathing tube placed to help me breathe when I’m asleep (intubation). In the event that my heart stops working properly, I understand that my anesthesiologist will make every effort to sustain my life, unless otherwise directed by [facility] Do Not Resuscitate documents.  Tell my anesthesia doctor before my procedure:  If I am pregnant.  The last time that I ate or drank.  iii. All of the medicines I take (including prescriptions, herbal supplements, and pills I can buy without a prescription (including street drugs/illegal medications). Failure to inform my anesthesiologist about these medicines may increase my risk of anesthetic complications.  iv.If I am allergic to anything or have had a reaction to anesthesia before.  I understand how the anesthesia medicine will help me (benefits).  I understand that with any type of anesthesia medicine there are risks:  The most common risks are: nausea, vomiting, sore throat, muscle soreness, damage to my eyes, mouth, or teeth (from breathing tube placement).  Rare risks include: remembering what happened during my procedure, allergic reactions to medications, injury to my airway, heart, lungs, vision, nerves, or  muscles and in extremely rare instances death.  My doctor has explained to me other choices available to me for my care (alternatives).  Pregnant Patients (“epidural”):  I understand that the risks of having an epidural (medicine given into my back to help control pain during labor), include itching, low blood pressure, difficulty urinating, headache or slowing of the baby’s heart. Very rare risks include infection, bleeding, seizure, irregular heart rhythms and nerve injury.  Regional Anesthesia (“spinal”, “epidural”, & “nerve blocks”):  I understand that rare but potential complications include headache, bleeding, infection, seizure, irregular heart rhythms, and nerve injury.    _____________________________________________________________________________  Patient (or Representative) Signature/Relationship to Patient  Date   Time    _____________________________________________________________________________   Name (if used)    Language/Organization   Time    _____________________________________________________________________________  Nurse Anesthetist Signature     Date   Time  _____________________________________________________________________________  Anesthesiologist Signature     Date   Time  I have discussed the procedure and information above with the patient (or patient’s representative) and answered their questions. The patient or their representative has agreed to have anesthesia services.    _____________________________________________________________________________  Witness        Date   Time  I have verified that the signature is that of the patient or patient’s representative, and that it was signed before the procedure  Patient Name: Valeria Hayes     : 1995                 Printed: 2025 at 1:54 PM    Medical Record #: [id number]                                            Page 1 of 1  ----------ANESTHESIA CONSENT----------

## (undated) NOTE — LETTER
10/11/24          RE:  Valeria Hayes  :  1995      To Whom It May Concern:    Valeria Hayes  is currently under our care for pregnancy.  It is permissible at her gestational age for dental work to be performed.  However, if x-rays are needed, please make sure that the abdomen is shielded appropriately, and thoroughly.      Analgesia is permissible with lidocaine only, no epinephrine.  For post treatment pain relief, Tylenol with codeine and Vicodin are acceptable. If an antibiotic is needed, a penicillin derivative may be used.  If penicillin allergic then erythromycin may be used.     If you have any additional concerns, do not hesitate to contact our office.    Sincerely,      Dr. Damaris Deshpande